# Patient Record
Sex: FEMALE | Race: WHITE | Employment: OTHER | URBAN - METROPOLITAN AREA
[De-identification: names, ages, dates, MRNs, and addresses within clinical notes are randomized per-mention and may not be internally consistent; named-entity substitution may affect disease eponyms.]

---

## 2017-01-06 ENCOUNTER — OFFICE VISIT (OUTPATIENT)
Dept: INTERNAL MEDICINE CLINIC | Age: 72
End: 2017-01-06

## 2017-01-06 VITALS
BODY MASS INDEX: 23.56 KG/M2 | HEIGHT: 64 IN | HEART RATE: 75 BPM | TEMPERATURE: 98.1 F | WEIGHT: 138 LBS | DIASTOLIC BLOOD PRESSURE: 70 MMHG | SYSTOLIC BLOOD PRESSURE: 120 MMHG | RESPIRATION RATE: 16 BRPM | OXYGEN SATURATION: 98 %

## 2017-01-06 DIAGNOSIS — I34.1 MITRAL VALVE PROLAPSE: ICD-10-CM

## 2017-01-06 DIAGNOSIS — R09.81 SINUS CONGESTION: Primary | ICD-10-CM

## 2017-01-06 RX ORDER — PREDNISONE 20 MG/1
TABLET ORAL
Qty: 20 TAB | Refills: 0 | Status: SHIPPED | OUTPATIENT
Start: 2017-01-06 | End: 2017-03-15 | Stop reason: ALTCHOICE

## 2017-01-06 NOTE — PROGRESS NOTES
1. Have you been to the ER, urgent care clinic since your last visit? Hospitalized since your last visit? No    2. Have you seen or consulted any other health care providers outside of the [address] [place] [address] since your last visit? Include any pap smears or colon screening.  No

## 2017-01-06 NOTE — PATIENT INSTRUCTIONS
Health Maintenance Due   Topic    Hepatitis C Screening     FOBT Q 1 YEAR AGE 50-75     ZOSTER VACCINE AGE 60>     GLAUCOMA SCREENING Q2Y     MEDICARE YEARLY EXAM

## 2017-01-07 NOTE — PROGRESS NOTES
The patient presents to the office today with the chief complaint of persistent sinus congestion    HPI    The patient persists with sinus congestion with mild facial fullness and drainage. She has a mild cough. The patient has mitral valve prolapse. She has occasional mild substernal pain. No palpitations. Review of Systems   Respiratory: Negative for shortness of breath. Cardiovascular: Positive for chest pain (Mild chest pain as per HPI). Negative for leg swelling. Allergies   Allergen Reactions    Antihistimine Swelling    Nitrofurantoin Nausea Only       Current Outpatient Prescriptions   Medication Sig Dispense Refill    predniSONE (DELTASONE) 20 mg tablet 3 tabs daily x 3 days, 2 tabs daily x 3 days, 1 tab daily x 3 days, 1/2 tab daily x 3 days 20 Tab 0    LORazepam (ATIVAN) 0.5 mg tablet TAKE 1 TABLET BY MOUTH THREE TIMES DAILY 90 Tab 0    escitalopram oxalate (LEXAPRO) 10 mg tablet TAKE 1 TABLET BY MOUTH DAILY 90 Tab 3    rosuvastatin (CRESTOR) 20 mg tablet TK 1 T PO QD  5    hydroxychloroquine (PLAQUENIL) 200 mg tablet Take 200 mg by mouth two (2) times a day.  aspirin 81 mg tablet Take 81 mg by mouth daily.  multivitamin (ONE A DAY) tablet Take 1 Tab by mouth daily.  omega-3 fatty acids-vitamin e (FISH OIL) 1,000 mg Cap Take 1 Cap by mouth daily.  CALCIUM CARB/VIT D3/MINERALS (CALCIUM 600 + MINERALS PO) Take 600 mg by mouth daily.  NITROSTAT 0.4 mg SL tablet ALLOW ONE TABLET TO DISSOLVE ON TONGUE Q 5 MINUTES PRF PAIN X 3 DOESES IF NEEDED. REPORT TO ER IF PAIN LASTS LONGER THAN 30 MINUTES  6       Past Medical History   Diagnosis Date    Acute upper respiratory infection     Anxiety disorder     Costal chondritis     Dyslipidemia     Dyspnea     Fibromyalgia     Hypotension     Jaw pain     Mitral valve prolapse     Pernicious anemia     Primary fibromyalgia syndrome     Pure hypercholesterolemia     Quadricuspid aortic valve ??       ECHO 7/14  Rheumatoid arthritis(714.0)     Visceral leishmaniasis        Past Surgical History   Procedure Laterality Date    Hx tonsillectomy      Hx partial hysterectomy      Hx heart catheterization  2004     normal       Social History     Social History    Marital status:      Spouse name: N/A    Number of children: N/A    Years of education: N/A     Occupational History    Not on file. Social History Main Topics    Smoking status: Former Smoker     Packs/day: 0.25     Quit date: 4/15/1975    Smokeless tobacco: Never Used    Alcohol use No      Comment: occasional    Drug use: No    Sexual activity: Yes     Partners: Male     Other Topics Concern    Not on file     Social History Narrative       Patient does not have an advanced directive on file    Visit Vitals    /70 (BP 1 Location: Left arm, BP Patient Position: Sitting)    Pulse 75    Temp 98.1 °F (36.7 °C)    Resp 16    Ht 5' 4\" (1.626 m)    Wt 138 lb (62.6 kg)    SpO2 98%    BMI 23.69 kg/m2       Physical Exam   HENT:   Ears:  Normal TM bilaterally   Cardiovascular: Normal rate and regular rhythm. Exam reveals no gallop. No murmur heard. Soft mid systolic click   Pulmonary/Chest: She has no wheezes. She has no rales. Hospital Outpatient Visit on 11/11/2016   Component Date Value Ref Range Status    LIPID PROFILE 11/11/2016        Final    Cholesterol, total 11/11/2016 145  <200 MG/DL Final    Triglyceride 11/11/2016 83  <150 MG/DL Final    HDL Cholesterol 11/11/2016 72* 40 - 60 MG/DL Final    LDL, calculated 11/11/2016 56.4  0 - 100 MG/DL Final    VLDL, calculated 11/11/2016 16.6  MG/DL Final    CHOL/HDL Ratio 11/11/2016 2.0  0 - 5.0   Final       .No results found for any visits on 01/06/17. Assessment / Plan      ICD-10-CM ICD-9-CM    1. Sinus congestion R09.81 478.19    2.  Mitral valve prolapse I34.1 424.0      Prednisone  she was advised to continue her maintenance medications  she is to call if symptoms persist over five days      Follow-up Disposition:  Return in about 5 months (around 6/6/2017). I asked Malik Guzman if she has any questions and I answered the questions. Beena Kong states that she understands the treatment plan and agrees with the treatment plan

## 2017-01-10 ENCOUNTER — TELEPHONE (OUTPATIENT)
Dept: INTERNAL MEDICINE CLINIC | Age: 72
End: 2017-01-10

## 2017-01-10 DIAGNOSIS — F41.9 ANXIETY DISORDER, UNSPECIFIED TYPE: ICD-10-CM

## 2017-01-11 ENCOUNTER — TELEPHONE (OUTPATIENT)
Dept: INTERNAL MEDICINE CLINIC | Age: 72
End: 2017-01-11

## 2017-01-11 RX ORDER — LORAZEPAM 0.5 MG/1
TABLET ORAL
Qty: 90 TAB | Refills: 0 | Status: SHIPPED | OUTPATIENT
Start: 2017-01-11 | End: 2017-02-13 | Stop reason: SDUPTHER

## 2017-01-13 ENCOUNTER — TELEPHONE (OUTPATIENT)
Dept: INTERNAL MEDICINE CLINIC | Age: 72
End: 2017-01-13

## 2017-01-13 NOTE — TELEPHONE ENCOUNTER
Prior Auth request received from Pharmacy for Lorazepam. [place] initiated on [email or web address]. Awaiting response.

## 2017-02-13 DIAGNOSIS — F41.9 ANXIETY DISORDER, UNSPECIFIED TYPE: ICD-10-CM

## 2017-02-13 RX ORDER — LORAZEPAM 0.5 MG/1
TABLET ORAL
Qty: 90 TAB | Refills: 0 | Status: SHIPPED | OUTPATIENT
Start: 2017-02-13 | End: 2017-03-24 | Stop reason: SDUPTHER

## 2017-02-14 ENCOUNTER — TELEPHONE (OUTPATIENT)
Dept: INTERNAL MEDICINE CLINIC | Age: 72
End: 2017-02-14

## 2017-03-03 ENCOUNTER — HOSPITAL ENCOUNTER (OUTPATIENT)
Dept: RESPIRATORY THERAPY | Age: 72
Discharge: HOME | End: 2017-03-03
Attending: INTERNAL MEDICINE
Payer: MEDICARE

## 2017-03-03 DIAGNOSIS — R06.02 SOB (SHORTNESS OF BREATH): ICD-10-CM

## 2017-03-03 PROCEDURE — 94010 BREATHING CAPACITY TEST: CPT

## 2017-03-03 PROCEDURE — 94727 GAS DIL/WSHOT DETER LNG VOL: CPT

## 2017-03-03 PROCEDURE — 94729 DIFFUSING CAPACITY: CPT

## 2017-03-15 ENCOUNTER — OFFICE VISIT (OUTPATIENT)
Dept: INTERNAL MEDICINE CLINIC | Age: 72
End: 2017-03-15

## 2017-03-15 VITALS
HEART RATE: 85 BPM | OXYGEN SATURATION: 98 % | SYSTOLIC BLOOD PRESSURE: 136 MMHG | HEIGHT: 64 IN | TEMPERATURE: 97.1 F | DIASTOLIC BLOOD PRESSURE: 80 MMHG | RESPIRATION RATE: 16 BRPM

## 2017-03-15 DIAGNOSIS — M79.7 PRIMARY FIBROMYALGIA SYNDROME: ICD-10-CM

## 2017-03-15 DIAGNOSIS — F41.9 ANXIETY DISORDER, UNSPECIFIED TYPE: ICD-10-CM

## 2017-03-15 DIAGNOSIS — E78.00 PURE HYPERCHOLESTEROLEMIA: ICD-10-CM

## 2017-03-15 NOTE — PROGRESS NOTES
1. Have you been to the ER, urgent care clinic since your last visit? Hospitalized since your last visit? No    2. Have you seen or consulted any other health care providers outside of the [facility] since your last visit? Include any pap smears or colon screening.  No

## 2017-03-15 NOTE — PATIENT INSTRUCTIONS
Health Maintenance Due   Topic Date Due   Donald Halsted Test  1945    Stool testing for trace blood  04/18/1995    Shingles Vaccine  04/18/2005    Glaucoma Screening   04/18/2010    Annual Well Visit  04/18/2010

## 2017-03-16 ENCOUNTER — HOSPITAL ENCOUNTER (OUTPATIENT)
Dept: CT IMAGING | Age: 72
Discharge: HOME | End: 2017-03-16
Attending: INTERNAL MEDICINE
Payer: MEDICARE

## 2017-03-16 DIAGNOSIS — R06.02 SOB (SHORTNESS OF BREATH): ICD-10-CM

## 2017-03-16 PROCEDURE — 71250 CT THORAX DX C-: CPT

## 2017-03-16 NOTE — PROGRESS NOTES
The patient presents to the office today with the chief complaint of decreased vision secondary to cataracts and for a pre op evaluation    HPI    Beena Guzman complains of decreased vision to bilateral cataracts. she is now scheduled for surgical correction. Other than her the patient has complaints of fatigue, aching and mild dyspnea on exertion. The patient has slight decrease the DLCO. She is scheduled for a chest ct. The patient denies chest pain. Review of Systems   Respiratory: Positive for shortness of breath (Mild dspnea on exertion). Negative for cough. Cardiovascular: Negative for chest pain and leg swelling. Allergies   Allergen Reactions    Antihistimine Swelling    Nitrofurantoin Nausea Only       Current Outpatient Prescriptions   Medication Sig Dispense Refill    PROAIR HFA 90 mcg/actuation inhaler INHALE 2 PUFFS BY MOUTH EVERY 4 HOURS AS NEEDED FOR WHEEZING 1 Inhaler 2    LORazepam (ATIVAN) 0.5 mg tablet TAKE 1 TABLET BY MOUTH THREE TIMES DAILY 90 Tab 0    escitalopram oxalate (LEXAPRO) 10 mg tablet TAKE 1 TABLET BY MOUTH DAILY 90 Tab 3    NITROSTAT 0.4 mg SL tablet ALLOW ONE TABLET TO DISSOLVE ON TONGUE Q 5 MINUTES PRF PAIN X 3 DOESES IF NEEDED. REPORT TO ER IF PAIN LASTS LONGER THAN 30 MINUTES  6    rosuvastatin (CRESTOR) 20 mg tablet TK 1 T PO QD  5    hydroxychloroquine (PLAQUENIL) 200 mg tablet Take 200 mg by mouth two (2) times a day.  aspirin 81 mg tablet Take 81 mg by mouth daily.  multivitamin (ONE A DAY) tablet Take 1 Tab by mouth daily.  omega-3 fatty acids-vitamin e (FISH OIL) 1,000 mg Cap Take 1 Cap by mouth daily.  CALCIUM CARB/VIT D3/MINERALS (CALCIUM 600 + MINERALS PO) Take 600 mg by mouth daily.          Past Medical History:   Diagnosis Date    Acute upper respiratory infection     Anxiety disorder     Costal chondritis     Dyslipidemia     Dyspnea     Fibromyalgia     Hypotension     Jaw pain     Mitral valve prolapse  Pernicious anemia     Primary fibromyalgia syndrome     Pure hypercholesterolemia     Quadricuspid aortic valve ?? ECHO 7/14    Rheumatoid arthritis(714.0)     Visceral leishmaniasis        Past Surgical History:   Procedure Laterality Date    HX HEART CATHETERIZATION  2004    normal    HX PARTIAL HYSTERECTOMY      HX TONSILLECTOMY         Social History     Social History    Marital status:      Spouse name: N/A    Number of children: N/A    Years of education: N/A     Occupational History    Not on file. Social History Main Topics    Smoking status: Former Smoker     Packs/day: 0.25     Quit date: 4/15/1975    Smokeless tobacco: Never Used    Alcohol use No      Comment: occasional    Drug use: No    Sexual activity: Yes     Partners: Male     Other Topics Concern    Not on file     Social History Narrative       Patient does not have an advanced directive on file    Visit Vitals    /80 (BP 1 Location: Right arm, BP Patient Position: Sitting)    Pulse 85    Temp 97.1 °F (36.2 °C) (Tympanic)    Resp 16    Ht 5' 4\" (1.626 m)    SpO2 98%       Physical Exam   No Cervical Lymphadenopathy  No Supraclavicular Lymphadenopathy  Thyroid is Normal  Lungs are clear to ausculation and percussion  Heart:  S1 S2 are normal, No gallops, No mummers  No Carotid Bruits  Abdomen:  Normal Bowel Sounds. No masses. No Hepatomegaly or Splenomegly  LE:  Strong Pedal Pulses. No Edema    BMI:  OK    No visits with results within 3 Month(s) from this visit.   Latest known visit with results is:    Hospital Outpatient Visit on 11/11/2016   Component Date Value Ref Range Status    LIPID PROFILE 11/11/2016        Final    Cholesterol, total 11/11/2016 145  <200 MG/DL Final    Triglyceride 11/11/2016 83  <150 MG/DL Final    HDL Cholesterol 11/11/2016 72* 40 - 60 MG/DL Final    LDL, calculated 11/11/2016 56.4  0 - 100 MG/DL Final    VLDL, calculated 11/11/2016 16.6  MG/DL Final    CHOL/HDL Ratio 11/11/2016 2.0  0 - 5.0   Final       .No results found for any visits on 03/15/17. Assessment / Plan      ICD-10-CM ICD-9-CM    1. Cataract, nuclear, bilateral H25.13 366.16    2. Primary fibromyalgia syndrome M79.7 729.1    3. Pure hypercholesterolemia E78.00 272.0    4. Anxiety disorder, unspecified type F41.9 300.00        THE PATIENT IS OK FOR SURGERY    Follow-up Disposition:  Return in about 6 months (around 9/15/2017). I asked Torres Guzman if she has any questions and I answered the questions. Beena Mirza states that she understands the treatment plan and agrees with the treatment plan

## 2017-03-17 ENCOUNTER — TELEPHONE (OUTPATIENT)
Dept: INTERNAL MEDICINE CLINIC | Age: 72
End: 2017-03-17

## 2017-03-24 DIAGNOSIS — F41.9 ANXIETY DISORDER, UNSPECIFIED TYPE: ICD-10-CM

## 2017-03-24 RX ORDER — LORAZEPAM 0.5 MG/1
TABLET ORAL
Qty: 90 TAB | Refills: 0 | Status: SHIPPED | OUTPATIENT
Start: 2017-03-24 | End: 2017-04-25 | Stop reason: SDUPTHER

## 2017-04-24 ENCOUNTER — HOSPITAL ENCOUNTER (OUTPATIENT)
Dept: LAB | Age: 72
Discharge: HOME | End: 2017-04-24
Payer: MEDICARE

## 2017-04-24 LAB
BASOPHILS # BLD AUTO: 0 K/UL (ref 0–0.1)
BASOPHILS # BLD: 0 % (ref 0–2)
CRP SERPL-MCNC: <0.3 MG/DL (ref 0–0.3)
DIFFERENTIAL METHOD BLD: NORMAL
EOSINOPHIL # BLD: 0.1 K/UL (ref 0–0.4)
EOSINOPHIL NFR BLD: 1 % (ref 0–5)
ERYTHROCYTE [DISTWIDTH] IN BLOOD BY AUTOMATED COUNT: 13.5 % (ref 11.6–14.5)
ERYTHROCYTE [SEDIMENTATION RATE] IN BLOOD: 3 MM/HR (ref 0–30)
HCT VFR BLD AUTO: 41.8 % (ref 35–45)
HGB BLD-MCNC: 14.4 G/DL (ref 12–16)
LYMPHOCYTES # BLD AUTO: 29 % (ref 21–52)
LYMPHOCYTES # BLD: 1.9 K/UL (ref 0.9–3.6)
MCH RBC QN AUTO: 32.1 PG (ref 24–34)
MCHC RBC AUTO-ENTMCNC: 34.4 G/DL (ref 31–37)
MCV RBC AUTO: 93.1 FL (ref 74–97)
MONOCYTES # BLD: 0.6 K/UL (ref 0.05–1.2)
MONOCYTES NFR BLD AUTO: 9 % (ref 3–10)
NEUTS SEG # BLD: 4 K/UL (ref 1.8–8)
NEUTS SEG NFR BLD AUTO: 61 % (ref 40–73)
PLATELET # BLD AUTO: 245 K/UL (ref 135–420)
PMV BLD AUTO: 10 FL (ref 9.2–11.8)
RBC # BLD AUTO: 4.49 M/UL (ref 4.2–5.3)
WBC # BLD AUTO: 6.6 K/UL (ref 4.6–13.2)

## 2017-04-24 PROCEDURE — 85025 COMPLETE CBC W/AUTO DIFF WBC: CPT | Performed by: INTERNAL MEDICINE

## 2017-04-24 PROCEDURE — 82785 ASSAY OF IGE: CPT | Performed by: INTERNAL MEDICINE

## 2017-04-24 PROCEDURE — 36415 COLL VENOUS BLD VENIPUNCTURE: CPT | Performed by: INTERNAL MEDICINE

## 2017-04-24 PROCEDURE — 86003 ALLG SPEC IGE CRUDE XTRC EA: CPT | Performed by: INTERNAL MEDICINE

## 2017-04-24 PROCEDURE — 86038 ANTINUCLEAR ANTIBODIES: CPT

## 2017-04-24 PROCEDURE — 86331 IMMUNODIFFUSION OUCHTERLONY: CPT

## 2017-04-24 PROCEDURE — 86038 ANTINUCLEAR ANTIBODIES: CPT | Performed by: INTERNAL MEDICINE

## 2017-04-24 PROCEDURE — 86431 RHEUMATOID FACTOR QUANT: CPT

## 2017-04-24 PROCEDURE — 85652 RBC SED RATE AUTOMATED: CPT | Performed by: INTERNAL MEDICINE

## 2017-04-24 PROCEDURE — 86140 C-REACTIVE PROTEIN: CPT | Performed by: INTERNAL MEDICINE

## 2017-04-24 PROCEDURE — 86480 TB TEST CELL IMMUN MEASURE: CPT | Performed by: INTERNAL MEDICINE

## 2017-04-24 PROCEDURE — 82164 ANGIOTENSIN I ENZYME TEST: CPT | Performed by: INTERNAL MEDICINE

## 2017-04-25 DIAGNOSIS — F41.9 ANXIETY DISORDER, UNSPECIFIED TYPE: ICD-10-CM

## 2017-04-25 RX ORDER — LORAZEPAM 0.5 MG/1
TABLET ORAL
Qty: 90 TAB | Refills: 0 | Status: SHIPPED | OUTPATIENT
Start: 2017-04-25 | End: 2017-05-18 | Stop reason: SDUPTHER

## 2017-04-25 RX ORDER — LORAZEPAM 0.5 MG/1
TABLET ORAL
Qty: 90 TAB | Refills: 0 | Status: SHIPPED | OUTPATIENT
Start: 2017-04-25 | End: 2017-04-25 | Stop reason: SDUPTHER

## 2017-04-26 LAB
ACE SERPL-CCNC: 40 U/L (ref 14–82)
ANA SER QL: NEGATIVE
IGE SERPL-ACNC: 17 IU/ML (ref 0–100)
SEE BELOW:, 164879: NORMAL

## 2017-04-27 LAB
A ALTERNATA IGE QN: <0.1 KU/L
A FUMIGATUS IGE QN: <0.1 KU/L
AMER ROACH IGE QN: <0.1 KU/L
AMER SYCAMORE IGE QN: <0.1 KU/L
ANNOTATION COMMENT IMP: NORMAL
BAHIA GRASS IGE QN: 0.26 KU/L
BERMUDA GRASS IGE QN: <0.1 KU/L
BOXELDER IGE QN: 1.09 KU/L
C HERBARUM IGE QN: <0.1 KU/L
CAT DANDER IGG QN: <0.1 KU/L
CLASS DESCRIPTION, 600268: ABNORMAL
COMMON RAGWEED IGE QN: 0.11 KU/L
D FARINAE IGE QN: <0.1 KU/L
D PTERONYSS IGE QN: <0.1 KU/L
DEPRECATED IGE QN: <0.1 KU/L
DOG DANDER IGE QN: <0.1 KU/L
ENGL PLANTAIN IGE QN: <0.1 KU/L
JOHNSON GRASS IGE QN: 0.28 KU/L
M RACEMOSUS IGE QN: <0.1 KU/L
M TB IFN-G CD4+ BCKGRND COR BLD-ACNC: <0 IU/ML
M TB IFN-G CD4+ T-CELLS BLD-ACNC: 0.02 IU/ML
M TB TUBERC IFN-G BLD QL: NEGATIVE
M TB TUBERC IGNF/MITOGEN IGNF CONTROL: 7.89 IU/ML
MT JUNIPER IGE QN: <0.1 KU/L
MUGWORT IGE QN: <0.1 KU/L
NETTLE IGE QN: <0.1 KU/L
P NOTATUM IGE QN: <0.1 KU/L
QUANTIFERON NIL VALUE: 0.04 IU/ML
S BOTRYOSUM IGE QN: <0.1 KU/L
SERVICE CMNT-IMP: NORMAL
SHEEP SORREL IGE QN: <0.1 KU/L
SWEET GUM IGE QN: <0.1 KU/L
TIMOTHY IGE QN: 0.71 KU/L
WHITE BIRCH IGE QN: <0.1 KU/L
WHITE ELM IGG QN: <0.1 KU/L
WHITE HICKORY IGE QN: <0.1 KU/L
WHITE MULBERRY IGE QN: <0.1 KU/L
WHITE OAK IGE QN: <0.1 KU/L

## 2017-05-03 LAB
Lab: NORMAL
Lab: NORMAL
REFERENCE LAB,REFLB: NORMAL
REFERENCE LAB,REFLB: NORMAL
TEST DESCRIPTION:,ATST: NORMAL
TEST DESCRIPTION:,ATST: NORMAL

## 2017-05-18 DIAGNOSIS — F41.9 ANXIETY DISORDER, UNSPECIFIED TYPE: ICD-10-CM

## 2017-05-18 RX ORDER — LORAZEPAM 0.5 MG/1
TABLET ORAL
Qty: 90 TAB | Refills: 0 | Status: SHIPPED | OUTPATIENT
Start: 2017-05-18 | End: 2017-06-27 | Stop reason: SDUPTHER

## 2017-05-22 ENCOUNTER — TELEPHONE (OUTPATIENT)
Dept: INTERNAL MEDICINE CLINIC | Age: 72
End: 2017-05-22

## 2017-06-27 DIAGNOSIS — F41.9 ANXIETY DISORDER, UNSPECIFIED TYPE: ICD-10-CM

## 2017-06-27 RX ORDER — LORAZEPAM 0.5 MG/1
TABLET ORAL
Qty: 90 TAB | Refills: 0 | Status: SHIPPED | OUTPATIENT
Start: 2017-06-27 | End: 2017-08-08 | Stop reason: SDUPTHER

## 2017-08-08 DIAGNOSIS — F41.9 ANXIETY DISORDER, UNSPECIFIED TYPE: ICD-10-CM

## 2017-08-08 RX ORDER — LORAZEPAM 0.5 MG/1
TABLET ORAL
Qty: 90 TAB | Refills: 0 | Status: SHIPPED | OUTPATIENT
Start: 2017-08-08 | End: 2017-09-18 | Stop reason: SDUPTHER

## 2017-08-25 ENCOUNTER — ANESTHESIA EVENT (OUTPATIENT)
Dept: ENDOSCOPY | Age: 72
End: 2017-08-25
Payer: MEDICARE

## 2017-08-28 ENCOUNTER — HOSPITAL ENCOUNTER (OUTPATIENT)
Age: 72
Setting detail: OUTPATIENT SURGERY
Discharge: HOME | End: 2017-08-28
Attending: INTERNAL MEDICINE | Admitting: INTERNAL MEDICINE
Payer: MEDICARE

## 2017-08-28 ENCOUNTER — ANESTHESIA (OUTPATIENT)
Dept: ENDOSCOPY | Age: 72
End: 2017-08-28
Payer: MEDICARE

## 2017-08-28 VITALS
OXYGEN SATURATION: 95 % | WEIGHT: 130.13 LBS | SYSTOLIC BLOOD PRESSURE: 132 MMHG | TEMPERATURE: 97.8 F | RESPIRATION RATE: 14 BRPM | BODY MASS INDEX: 22.22 KG/M2 | DIASTOLIC BLOOD PRESSURE: 71 MMHG | HEIGHT: 64 IN | HEART RATE: 82 BPM

## 2017-08-28 LAB
ATRIAL RATE: 74 BPM
CALCULATED P AXIS, ECG09: 4 DEGREES
CALCULATED R AXIS, ECG10: -11 DEGREES
CALCULATED T AXIS, ECG11: 35 DEGREES
DIAGNOSIS, 93000: NORMAL
P-R INTERVAL, ECG05: 144 MS
Q-T INTERVAL, ECG07: 386 MS
QRS DURATION, ECG06: 88 MS
QTC CALCULATION (BEZET), ECG08: 428 MS
VENTRICULAR RATE, ECG03: 74 BPM

## 2017-08-28 PROCEDURE — 77030019988 HC FCPS ENDOSC DISP BSC -B: Performed by: INTERNAL MEDICINE

## 2017-08-28 PROCEDURE — 77030008565 HC TBNG SUC IRR ERBE -B: Performed by: INTERNAL MEDICINE

## 2017-08-28 PROCEDURE — 74011250636 HC RX REV CODE- 250/636: Performed by: NURSE ANESTHETIST, CERTIFIED REGISTERED

## 2017-08-28 PROCEDURE — 88305 TISSUE EXAM BY PATHOLOGIST: CPT | Performed by: INTERNAL MEDICINE

## 2017-08-28 PROCEDURE — 74011250636 HC RX REV CODE- 250/636

## 2017-08-28 PROCEDURE — 77030018846 HC SOL IRR STRL H20 ICUM -A: Performed by: INTERNAL MEDICINE

## 2017-08-28 PROCEDURE — 74011000250 HC RX REV CODE- 250: Performed by: NURSE ANESTHETIST, CERTIFIED REGISTERED

## 2017-08-28 PROCEDURE — 74011000250 HC RX REV CODE- 250

## 2017-08-28 PROCEDURE — 88342 IMHCHEM/IMCYTCHM 1ST ANTB: CPT | Performed by: INTERNAL MEDICINE

## 2017-08-28 PROCEDURE — 93005 ELECTROCARDIOGRAM TRACING: CPT

## 2017-08-28 PROCEDURE — 76060000031 HC ANESTHESIA FIRST 0.5 HR: Performed by: INTERNAL MEDICINE

## 2017-08-28 PROCEDURE — 76040000019: Performed by: INTERNAL MEDICINE

## 2017-08-28 RX ORDER — PROPOFOL 10 MG/ML
INJECTION, EMULSION INTRAVENOUS AS NEEDED
Status: DISCONTINUED | OUTPATIENT
Start: 2017-08-28 | End: 2017-08-28 | Stop reason: HOSPADM

## 2017-08-28 RX ORDER — GLYCOPYRROLATE 0.2 MG/ML
INJECTION INTRAMUSCULAR; INTRAVENOUS AS NEEDED
Status: DISCONTINUED | OUTPATIENT
Start: 2017-08-28 | End: 2017-08-28 | Stop reason: HOSPADM

## 2017-08-28 RX ORDER — SODIUM CHLORIDE, SODIUM LACTATE, POTASSIUM CHLORIDE, CALCIUM CHLORIDE 600; 310; 30; 20 MG/100ML; MG/100ML; MG/100ML; MG/100ML
75 INJECTION, SOLUTION INTRAVENOUS CONTINUOUS
Status: DISCONTINUED | OUTPATIENT
Start: 2017-08-29 | End: 2017-08-28 | Stop reason: HOSPADM

## 2017-08-28 RX ORDER — ONDANSETRON 2 MG/ML
4 INJECTION INTRAMUSCULAR; INTRAVENOUS ONCE
Status: CANCELLED | OUTPATIENT
Start: 2017-08-28 | End: 2017-08-28

## 2017-08-28 RX ORDER — SODIUM CHLORIDE 0.9 % (FLUSH) 0.9 %
5-10 SYRINGE (ML) INJECTION AS NEEDED
Status: CANCELLED | OUTPATIENT
Start: 2017-08-28

## 2017-08-28 RX ADMIN — PROPOFOL 20 MG: 10 INJECTION, EMULSION INTRAVENOUS at 10:51

## 2017-08-28 RX ADMIN — GLYCOPYRROLATE 0.2 MG: 0.2 INJECTION INTRAMUSCULAR; INTRAVENOUS at 10:47

## 2017-08-28 RX ADMIN — FAMOTIDINE 20 MG: 10 INJECTION, SOLUTION INTRAVENOUS at 10:38

## 2017-08-28 RX ADMIN — PROPOFOL 100 MG: 10 INJECTION, EMULSION INTRAVENOUS at 10:48

## 2017-08-28 RX ADMIN — SODIUM CHLORIDE, SODIUM LACTATE, POTASSIUM CHLORIDE, AND CALCIUM CHLORIDE 75 ML/HR: 600; 310; 30; 20 INJECTION, SOLUTION INTRAVENOUS at 10:39

## 2017-08-28 NOTE — H&P
H&P is updated and reviewed, physical exam was performed and medications/allergies were reviewed immediately prior to anesthesia.     Lucy Sheldon MD

## 2017-08-28 NOTE — DISCHARGE INSTRUCTIONS
Upper GI Endoscopy: What to Expect at [address] [place] [address]  After you have an endoscopy, you will stay at the hospital or clinic for 1 to 2 hours. This will allow the medicine to wear off. You will be able to go home after your doctor or nurse checks to make sure you are not having any problems. You may have to stay overnight if you had treatment during the test. You may have a sore throat for a day or two after the test.  This care sheet gives you a general idea about what to expect after the test.  How can you care for yourself at home? Activity  · Rest as much as you need to after you go home. · You should be able to go back to your usual activities the day after the test.  Diet  · Follow your doctor's directions for eating after the test.  · Drink plenty of fluids (unless your doctor has told you not to). Medications  · If you have a sore throat the day after the test, use an over-the-counter spray to numb your throat. Follow-up care is a key part of your treatment and safety. Be sure to make and go to all appointments, and call your doctor if you are having problems. It's also a good idea to know your test results and keep a list of the medicines you take. When should you call for help? Call 911 anytime you think you may need emergency care. For example, call if:  · You passed out (lost consciousness). · You cough up blood. · You vomit blood or what looks like coffee grounds. · You pass maroon or very bloody stools. Call your doctor now or seek immediate medical care if:  · You have trouble swallowing. · You have belly pain. · Your stools are black and tarlike or have streaks of blood. · You are sick to your stomach or cannot keep fluids down. Watch closely for changes in your health, and be sure to contact your doctor if:  · Your throat still hurts after a day or two. · You do not get better as expected. Where can you learn more? Go to http://benja-alex.info/.   Enter A899 in the search box to learn more about \"Upper GI Endoscopy: What to Expect at Home. \"  Current as of: August 9, 2016  Content Version: 11.3  © 0846-5144 [employer]. Care instructions adapted under license by [employer] (which disclaims liability or warranty for this information). If you have questions about a medical condition or this instruction, always ask your healthcare professional. [facility] [address] any warranty or liability for your use of this information. Peptic Ulcer Disease: Care Instructions  Your Care Instructions    Peptic ulcers are sores on the inside of the stomach or the small intestine. They are usually caused by an infection with bacteria or from use of nonsteroidal anti-inflammatory drugs (NSAIDs). NSAIDs include aspirin, ibuprofen (Advil), and naproxen (Aleve). Your doctor may have prescribed medicine to reduce stomach acid. You also may need to take antibiotics if your peptic ulcers are caused by an infection. You can help your stomach heal and keep ulcers from coming back by making some changes in your lifestyle. Quit smoking, limit caffeine and alcohol, and reduce stress. Follow-up care is a key part of your treatment and safety. Be sure to make and go to all appointments, and call your doctor if you are having problems. Its also a good idea to know your test results and keep a list of the medicines you take. How can you care for yourself at home? · Take your medicines exactly as prescribed. Call your doctor if you think you are having a problem with your medicine. · Do not take aspirin or other NSAIDs such as ibuprofen (Advil or Motrin) or naproxen (Aleve). Ask your doctor what you can take for pain. · Do not smoke. Smoking can make ulcers worse. If you need help quitting, talk to your doctor about stop-smoking programs and medicines. These can increase your chances of quitting for good. · Drink in moderation or avoid drinking alcohol.   · Do not drink beverages that have caffeine if they bother your stomach. These include coffee, tea, and soda. · Eat a balanced diet of small, frequent meals. Make an appointment with a dietitian if you need help planning your meals. · Reduce stress. Avoid people and places that make you feel anxious, if you can. Learn ways to reduce stress, such as biofeedback, guided imagery, and meditation. When should you call for help? Call 911 anytime you think you may need emergency care. For example, call if:  · You passed out (lost consciousness). · You vomit blood or what looks like coffee grounds. · You pass maroon or very bloody stools. Call your doctor now or seek immediate medical care if:  · You have severe pain in your belly, back, or shoulders. · You have new or worsening belly pain. · You are dizzy or lightheaded, or you feel like you may faint. · Your stools are black and tarlike or have streaks of blood. Watch closely for changes in your health, and be sure to contact your doctor if:  · You have new symptoms such as weight loss, nausea or vomiting. · You do not feel better as expected. Where can you learn more? Go to http://benja-alex.info/. Enter V452 in the search box to learn more about \"Peptic Ulcer Disease: Care Instructions. \"  Current as of: August 9, 2016  Content Version: 11.3  © 8562-0116 [employer]. Care instructions adapted under license by [employer] (which disclaims liability or warranty for this information). If you have questions about a medical condition or this instruction, always ask your healthcare professional. [place] [address] any warranty or liability for your use of this information.     DISCHARGE SUMMARY from Nurse    The following personal items are in your possession at time of discharge:    Dental Appliances: None  Visual Aid: None                  PATIENT INSTRUCTIONS:    After general anesthesia or intravenous sedation, for 24 hours or while taking prescription Narcotics:  · Limit your activities  · Do not drive and operate hazardous machinery  · Do not make important personal or business decisions  · Do  not drink alcoholic beverages  · If you have not urinated within 8 hours after discharge, please contact your surgeon on call. Report the following to your surgeon:  · Excessive pain, swelling, redness or odor of or around the surgical area  · Temperature over 100.5  · Nausea and vomiting lasting longer than 4 hours or if unable to take medications  · Any signs of decreased circulation or nerve impairment to extremity: change in color, persistent  numbness, tingling, coldness or increase pain  · Any questions      *  Please give a list of your current medications to your Primary Care Provider. *  Please update this list whenever your medications are discontinued, doses are      changed, or new medications (including over-the-counter products) are added. *  Please carry medication information at all times in case of emergency situations. These are general instructions for a healthy lifestyle:    No smoking/ No tobacco products/ Avoid exposure to second hand smoke    Surgeon General's Warning:  Quitting smoking now greatly reduces serious risk to your health. Obesity, smoking, and sedentary lifestyle greatly increases your risk for illness    A healthy diet, regular physical exercise & weight monitoring are important for maintaining a healthy lifestyle    You may be retaining fluid if you have a history of heart failure or if you experience any of the following symptoms:  Weight gain of 3 pounds or more overnight or 5 pounds in a week, increased swelling in our hands or feet or shortness of breath while lying flat in bed. Please call your doctor as soon as you notice any of these symptoms; do not wait until your next office visit.     Recognize signs and symptoms of STROKE:    F-face looks uneven    A-arms unable to move or move unevenly    S-speech slurred or non-existent    T-time-call 911 as soon as signs and symptoms begin-DO NOT go       Back to bed or wait to see if you get better-TIME IS BRAIN. Warning Signs of HEART ATTACK     Call 911 if you have these symptoms:   Chest discomfort. Most heart attacks involve discomfort in the center of the chest that lasts more than a few minutes, or that goes away and comes back. It can feel like uncomfortable pressure, squeezing, fullness, or pain.  Discomfort in other areas of the upper body. Symptoms can include pain or discomfort in one or both arms, the back, neck, jaw, or stomach.  Shortness of breath with or without chest discomfort.  Other signs may include breaking out in a cold sweat, nausea, or lightheadedness. Don't wait more than five minutes to call 911 - MINUTES MATTER! Fast action can save your life. Calling 911 is almost always the fastest way to get lifesaving treatment. Emergency Medical Services staff can begin treatment when they arrive -- up to an hour sooner than if someone gets to the hospital by car. The discharge information has been reviewed with the patient and spouse. The patient and spouse verbalized understanding. Discharge medications reviewed with the patient and spouse and appropriate educational materials and side effects teaching were provided.

## 2017-08-28 NOTE — ANESTHESIA PREPROCEDURE EVALUATION
Anesthetic History               Review of Systems / Medical History  Patient summary reviewed, nursing notes reviewed and pertinent labs reviewed    Pulmonary                   Neuro/Psych         Psychiatric history     Cardiovascular                       GI/Hepatic/Renal               Comments: dysphagia Endo/Other        Arthritis     Other Findings   Comments:   Risk Factors for Postoperative nausea/vomiting:       History of postoperative nausea/vomiting? NO       Female? YES       Motion sickness? NO       Intended opioid administration for postoperative analgesia? NO      Smoking Abstinence  Current Smoker? NO  Elective Surgery? YES  Seen preoperatively by anesthesiologist or proxy prior to day of surgery? YES  Pt abstained from smoking 24 hours prior to anesthesia?  N/A           Physical Exam    Airway  Mallampati: II  TM Distance: > 6 cm  Neck ROM: normal range of motion   Mouth opening: Normal     Cardiovascular    Rhythm: regular  Rate: normal         Dental  No notable dental hx       Pulmonary  Breath sounds clear to auscultation               Abdominal  GI exam deferred       Other Findings            Anesthetic Plan    ASA: 2  Anesthesia type: MAC          Induction: Intravenous  Anesthetic plan and risks discussed with: Patient

## 2017-08-28 NOTE — IP AVS SNAPSHOT
Zander Paul 
 
 
 0 [facility] [address] 
[phone] Patient: Imelda Lindsay MRN: [id number] You are allergic to the following Allergen Reactions Antihistimine Swelling Nitrofurantoin Nausea Only Recent Documentation Height Weight BMI OB Status Smoking Status 1.626 m 59 kg 22.34 kg/m2 Hysterectomy Former Smoker Emergency Contacts Name Discharge Info Relation Home Work Mobile ThomasJoaquin DISCHARGE CAREGIVER [3] Spouse [3] [phone] About your hospitalization You were admitted on:  2017 You last received care in the:  [facility] PACU You were discharged on:  2017 Unit phone number:  [phone] Why you were hospitalized Your primary diagnosis was:  Not on File Providers Seen During Your Hospitalizations Provider Role Specialty Primary office phone Uma Way MD Attending Provider Gastroenterology [phone] Your Primary Care Physician (PCP) Primary Care Physician Office Phone Office Fax [phone] , [address] [place] [address] [phone] Follow-up Information Follow up With Details Comments Contact Info Edgar Coello MD   [address] [facility] [address] [phone] Uma [address] MD  Follow up as instructed [address] [place] [address] Drive [address] [facility] [address] 
[phone] Your Appointments 2017 ENDOSCOPY with Uma Way MD  
[facility] ENDOSCOPY [address] [facility] [address]) 920 [facility] [address] 2017  3:00 PM EDT  
COMPLETE PHYSICAL with Edgar Coello MD  
[address] [place] [address] Road) 340 [facility], [address] [facility] [address] [phone] Current Discharge Medication List  
  
CONTINUE these medications which have NOT CHANGED Dose & Instructions Dispensing Information Comments Morning Noon Evening Bedtime  
 aspirin 81 mg tablet Your last dose was: Your next dose is:    
   
   
 Dose:  81 mg Take 81 mg by mouth daily. Refills:  0  
     
   
   
   
  
 CALCIUM 600 + MINERALS PO Your last dose was: Your next dose is:    
   
   
 Dose:  600 mg Take 600 mg by mouth daily. Refills:  0  
     
   
   
   
  
 escitalopram oxalate 10 mg tablet Commonly known as:  Henrry Nascimento Your last dose was: Your next dose is: TAKE 1 TABLET BY MOUTH DAILY Quantity:  90 Tab Refills:  3  
 **Patient requests 90 days supply** FISH OIL 1,000 mg Cap Generic drug:  omega-3 fatty acids-vitamin e Your last dose was: Your next dose is:    
   
   
 Dose:  1 Cap Take 1 Cap by mouth daily. Refills:  0 LORazepam 0.5 mg tablet Commonly known as:  ATIVAN Your last dose was: Your next dose is: TAKE 1 TABLET BY MOUTH THREE TIMES DAILY Quantity:  90 Tab Refills:  0  
     
   
   
   
  
 multivitamin tablet Commonly known as:  ONE A DAY Your last dose was: Your next dose is:    
   
   
 Dose:  1 Tab Take 1 Tab by mouth daily. Refills:  0  
     
   
   
   
  
 NITROSTAT 0.4 mg SL tablet Generic drug:  nitroglycerin Your last dose was: Your next dose is:    
   
   
 ALLOW ONE TABLET TO DISSOLVE ON TONGUE Q 5 MINUTES PRF PAIN X 3 DOESES IF NEEDED. REPORT TO ER IF PAIN LASTS LONGER THAN 30 MINUTES Refills:  6 PLAQUENIL 200 mg tablet Generic drug:  hydroxychloroquine Your last dose was: Your next dose is:    
   
   
 Dose:  200 mg Take 200 mg by mouth two (2) times a day. Refills:  0 PROAIR HFA 90 mcg/actuation inhaler Generic drug:  albuterol Your last dose was: Your next dose is:    
   
   
 INHALE 2 PUFFS BY MOUTH EVERY 4 HOURS AS NEEDED FOR WHEEZING Quantity:  1 Inhaler Refills:  2  
     
   
   
   
  
 rosuvastatin 20 mg tablet Commonly known as:  CRESTOR Your last dose was: Your next dose is:    
   
   
 TK 1 T PO QD Refills:  5 Discharge Instructions Upper GI Endoscopy: What to Expect at [facility] Your Recovery After you have an endoscopy, you will stay at the hospital or clinic for 1 to 2 hours. This will allow the medicine to wear off. You will be able to go home after your doctor or nurse checks to make sure you are not having any problems. You may have to stay overnight if you had treatment during the test. You may have a sore throat for a day or two after the test. 
This care sheet gives you a general idea about what to expect after the test. 
How can you care for yourself at home? Activity · Rest as much as you need to after you go home. · You should be able to go back to your usual activities the day after the test. 
Diet · Follow your doctor's directions for eating after the test. 
· Drink plenty of fluids (unless your doctor has told you not to). Medications · If you have a sore throat the day after the test, use an over-the-counter spray to numb your throat. Follow-up care is a key part of your treatment and safety. Be sure to make and go to all appointments, and call your doctor if you are having problems. It's also a good idea to know your test results and keep a list of the medicines you take. When should you call for help? Call 911 anytime you think you may need emergency care. For example, call if: 
· You passed out (lost consciousness). · You cough up blood. · You vomit blood or what looks like coffee grounds. · You pass maroon or very bloody stools. Call your doctor now or seek immediate medical care if: 
· You have trouble swallowing. · You have belly pain. · Your stools are black and tarlike or have streaks of blood. · You are sick to your stomach or cannot keep fluids down. Watch closely for changes in your health, and be sure to contact your doctor if: 
· Your throat still hurts after a day or two. · You do not get better as expected. Where can you learn more? Go to http://benja-alex.info/. Enter ([phone] [phone] in the search box to learn more about \"Upper GI Endoscopy: What to Expect at Home. \" Current as of: August 9, 2016 Content Version: 11.3 © 7058-1774 [employer] Care instructions adapted under license by [employer] (which disclaims liability or warranty for this information). If you have questions about a medical condition or this instruction, always ask your healthcare professional. [place] [address] any warranty or liability for your use of this information. Peptic Ulcer Disease: Care Instructions Your Care Instructions Peptic ulcers are sores on the inside of the stomach or the small intestine. They are usually caused by an infection with bacteria or from use of nonsteroidal anti-inflammatory drugs (NSAIDs). NSAIDs include aspirin, ibuprofen (Advil), and naproxen (Aleve). Your doctor may have prescribed medicine to reduce stomach acid. You also may need to take antibiotics if your peptic ulcers are caused by an infection. You can help your stomach heal and keep ulcers from coming back by making some changes in your lifestyle. Quit smoking, limit caffeine and alcohol, and reduce stress. Follow-up care is a key part of your treatment and safety. Be sure to make and go to all appointments, and call your doctor if you are having problems. Its also a good idea to know your test results and keep a list of the medicines you take. How can you care for yourself at home? · Take your medicines exactly as prescribed. Call your doctor if you think you are having a problem with your medicine. · Do not take aspirin or other NSAIDs such as ibuprofen (Advil or Motrin) or naproxen (Aleve). Ask your doctor what you can take for pain. · Do not smoke. Smoking can make ulcers worse. If you need help quitting, talk to your doctor about stop-smoking programs and medicines. These can increase your chances of quitting for good. · Drink in moderation or avoid drinking alcohol. · Do not drink beverages that have caffeine if they bother your stomach. These include coffee, tea, and soda. · Eat a balanced diet of small, frequent meals. Make an appointment with a dietitian if you need help planning your meals. · Reduce stress. Avoid people and places that make you feel anxious, if you can. Learn ways to reduce stress, such as biofeedback, guided imagery, and meditation. When should you call for help? Call 911 anytime you think you may need emergency care. For example, call if: 
· You passed out (lost consciousness). · You vomit blood or what looks like coffee grounds. · You pass maroon or very bloody stools. Call your doctor now or seek immediate medical care if: 
· You have severe pain in your belly, back, or shoulders. · You have new or worsening belly pain. · You are dizzy or lightheaded, or you feel like you may faint. · Your stools are black and tarlike or have streaks of blood. Watch closely for changes in your health, and be sure to contact your doctor if: 
· You have new symptoms such as weight loss, nausea or vomiting. · You do not feel better as expected. Where can you learn more? Go to http://benja-alex.info/. Enter J770 in the search box to learn more about \"Peptic Ulcer Disease: Care Instructions. \" Current as of: August 9, 2016 Content Version: 11.3 © 9033-8184 [employer].  Care instructions adapted under license by [address] (which disclaims liability or warranty for this information). If you have questions about a medical condition or this instruction, always ask your healthcare professional. [address] any warranty or liability for your use of this information. DISCHARGE SUMMARY from Nurse The following personal items are in your possession at time of discharge: 
 
Dental Appliances: None Visual Aid: None PATIENT INSTRUCTIONS: 
 
 
F-face looks uneven A-arms unable to move or move unevenly S-speech slurred or non-existent T-time-call 911 as soon as signs and symptoms begin-DO NOT go Back to bed or wait to see if you get better-TIME IS BRAIN. Warning Signs of HEART ATTACK Call 911 if you have these symptoms: 
? Chest discomfort. Most heart attacks involve discomfort in the center of the chest that lasts more than a few minutes, or that goes away and comes back. It can feel like uncomfortable pressure, squeezing, fullness, or pain. ? Discomfort in other areas of the upper body. Symptoms can include pain or discomfort in one or both arms, the back, neck, jaw, or stomach. ? Shortness of breath with or without chest discomfort. ? Other signs may include breaking out in a cold sweat, nausea, or lightheadedness. Don't wait more than five minutes to call [address]! Fast action can save your life. Calling 911 is almost always the fastest way to get lifesaving treatment. Emergency Medical Services staff can begin treatment when they arrive  up to an hour sooner than if someone gets to the hospital by car. The discharge information has been reviewed with the patient and spouse. The patient and spouse verbalized understanding. Discharge medications reviewed with the patient and spouse and appropriate educational materials and side effects teaching were provided. Discharge Orders None ACO Transitions of Care Introducing [employer] [address] Moreno offers a voluntary care coordination program to provide high quality service and care to [facility] fee-for-service beneficiaries. Santhosh Quinn was designed to help you enhance your health and well-being through the following services: ? Transitions of Care  support for individuals who are transitioning from one care setting to another (example: Hospital to home). ? Chronic and Complex Care Coordination  support for individuals and caregivers of those with serious or chronic illnesses or with more than one chronic (ongoing) condition and those who take a number of different medications. If you meet specific medical criteria, a [address] [place] [address] Rd may call you directly to coordinate your care with your primary care physician and your other care providers. For questions about the [facility] programs, please, contact your physicians office. For general questions or additional information about Accountable Care Organizations: 
Please visit www.medicare.gov/acos. html or call 1-800-MEDICARE (2-944.559.6179) TTY users should call 4-836.422.6984. Introducing [facility]! Dear Britt Griffin: 
Thank you for requesting a Link To Media account. Our records indicate that you already have an active Link To Media account. You can access your account anytime at [email or web address] Office [email or web address] [employer]/Your Office Agent Did you know that you can access your hospital and ER discharge instructions at any time in Link To Media? You can also review all of your test results from your hospital stay or ER visit. Additional Information If you have questions, please visit the Frequently Asked Questions section of the Link To Media website at [email or web address] Office [email or web address] [employer]/Your Office [email or web address] Remember, Link To Media is NOT to be used for urgent needs. For medical emergencies, dial 911. Now available from your iPhone and Android! General Information Please provide this summary of care documentation to your next provider. Patient Signature:  ____________________________________________________________ Date:  ____________________________________________________________  
  
Asya Pane Provider Signature:  ____________________________________________________________ Date:  ____________________________________________________________

## 2017-08-28 NOTE — ANESTHESIA POSTPROCEDURE EVALUATION
Post-Anesthesia Evaluation & Assessment    Visit Vitals    /61    Pulse 88    Temp 36.2 °C (97.2 °F)    Resp 19    Ht 5' 4\" (1.626 m)    Wt 59 kg (130 lb 2 oz)    SpO2 100%    BMI 22.34 kg/m2       Post-operative hydration adequate. Pain score (VAS): 0 Pain Scale 1: Numeric (0 - 10) (08/28/17 1056)  Pain Intensity 1: 0 (08/28/17 1056)   Managed. Mental status & Level of consciousness: returned to baseline    Neurological status: returned to baseline    Pulmonary status: airway patent, oxygen given as needed. Complications related to anesthesia: none    Patient has met all discharge requirements.     Additional comments:        Judy Cummings MD  August 28, 2017

## 2017-09-15 ENCOUNTER — OFFICE VISIT (OUTPATIENT)
Dept: INTERNAL MEDICINE CLINIC | Age: 72
End: 2017-09-15

## 2017-09-15 VITALS
WEIGHT: 130 LBS | HEART RATE: 81 BPM | HEIGHT: 64 IN | OXYGEN SATURATION: 100 % | RESPIRATION RATE: 16 BRPM | TEMPERATURE: 98.1 F | SYSTOLIC BLOOD PRESSURE: 152 MMHG | BODY MASS INDEX: 22.2 KG/M2 | DIASTOLIC BLOOD PRESSURE: 84 MMHG

## 2017-09-15 DIAGNOSIS — M79.7 PRIMARY FIBROMYALGIA SYNDROME: ICD-10-CM

## 2017-09-15 DIAGNOSIS — Z23 ENCOUNTER FOR IMMUNIZATION: ICD-10-CM

## 2017-09-15 DIAGNOSIS — F41.9 ANXIETY DISORDER, UNSPECIFIED TYPE: ICD-10-CM

## 2017-09-15 DIAGNOSIS — K29.00 ACUTE SUPERFICIAL GASTRITIS WITHOUT HEMORRHAGE: Primary | ICD-10-CM

## 2017-09-15 RX ORDER — PANTOPRAZOLE SODIUM 40 MG/1
TABLET, DELAYED RELEASE ORAL
Refills: 5 | COMMUNITY
Start: 2017-08-28 | End: 2018-01-03

## 2017-09-15 RX ORDER — AA/PROT/LYSINE/METHIO/VIT C/B6 50-12.5 MG
TABLET ORAL
COMMUNITY
End: 2018-06-24 | Stop reason: ALTCHOICE

## 2017-09-15 RX ORDER — MENTHOL
1000 GEL (GRAM) TOPICAL DAILY
COMMUNITY
End: 2018-08-02 | Stop reason: ALTCHOICE

## 2017-09-15 NOTE — PATIENT INSTRUCTIONS
Medicare Wellness Visit, Female    The best way to improve and maintain good health is to have a healthy lifestyle by eating a well-balanced diet, exercising regularly, limiting alcohol and stopping smoking. Regular visits with your physician or non-physician health care provider also support your good health. Preventive screening tests can find health problems before they become diseases or illnesses. Preventive services such as immunizations prevent serious infections. All people over age 72 should have a Pneumovax and a Prevnar-13 shot to prevent potentially life threatening infections with the pneumococcus bacteria, a common cause of pneumonia. These are once in a lifetime unless you and your provider decide differently. All people over 65 should have a yearly influenza vaccine or \"flu\" shot. This does not prevent infection with cold viruses but has been proven to prevent hospitalization and death from influenza. Although Medicare part B \"regular Medicare\" currently only covers tetanus vaccination in the context of an injury, a tetanus vaccine (Tdap or Td) is recommended every 10 years. A shingles vaccine is recommended once in a lifetime after age 61. The Shingles vaccine is also not covered by Medicare part B. Note, however, that both the Shingles vaccine and Tdap/Td are generally covered by secondary carriers. Please check your coverage and out of pocket expenses. Consider contacting your local health department because it may stock these vaccines for a reasonable charge. We currently have documentation of the following immunization history for you:  Immunization History   Administered Date(s) Administered    Influenza High Dose Vaccine PF 10/25/2016    Influenza Vaccine (Quad) PF 10/26/2015    Pneumococcal Conjugate (PCV-13) 10/25/2016    Tdap 10/01/2013, 10/26/2015       Screening for infection with Hepatitis C is recommended for anyone born between [birth year] through 1965. The table at the bottom of this document indicates the status of this and other preventive services. A bone mass density test (DEXA) to screen for osteoporosis or thinning of the bones should be done at least once after age 72 and may be done up to every 2 years as determined by you and your health care provider. The most recent DEXA we have on file for you is:      Screening for diabetes mellitus with a blood sugar test (glucose) should be done at least every 3 years until age 79. You and your health care provider may decide whether to continue screening after age 79. The most recent blood glucose we have on file for you is:   Lab Results   Component Value Date/Time    Glucose 88 09/26/2016 01:20 PM         Glaucoma is a disease of the eye due to increased ocular pressure that can lead to blindness. People with risk factors for glaucoma ( race, diabetes, family history) should be screened at least every 2 years by an eye professional.     Cardiovascular screening tests that check for elevated lipids or cholesterol (fatty part of blood) which can lead to heart disease and strokes should be done every 4-6 years through age 79. You and your health care provider may decide whether to continue screening after age 79. The most recent lipid panel we have on file for you is:   Lab Results   Component Value Date/Time    Cholesterol, total 145 11/11/2016 12:45 PM    HDL Cholesterol 72 11/11/2016 12:45 PM    LDL, calculated 56.4 11/11/2016 12:45 PM    VLDL, calculated 16.6 11/11/2016 12:45 PM    Triglyceride 83 11/11/2016 12:45 PM    CHOL/HDL Ratio 2.0 11/11/2016 12:45 PM       Colorectal cancer screening that evaluates for blood or polyps in your colon for people with average risk should be done yearly as a stool test, every five years as a flexible sigmoidoscope or every 10 years as a colonoscopy up to age 76. You and your health care provider may decide whether to continue screening after age 76.     Breast cancer screening with a mammogram is recommended at least once every 2 years  for women age 54-69. You and your health care provider may decide whether to continue screening after age 76. The most recent mammogram we have on file for you is:   [facility] Results (most recent):  No results found for this or any previous visit. Screening for cervical cancer with a pap smear is recommended for all women with a cervix until age 72. The frequency of this test is based on the details of her prior pap smear testing. You and your health care provider may decide whether to continue screening after age 72. If you have been a smoker or had family history of abdominal aortic aneurysms, you and your provider may decide to schedule an ultrasound test of your aorta. If you have questions regarding this please ask your health care provider    People ages 54 to [de-identified] years who have smoked the equivalent of 1 pack per day for 30 years or more may benefit from screening for lung cancer with a yearly low dose CT scan until they have been non smokers for 15 years. Please ask your health care provider if you have any questions    Your Medicare Wellness Exam is recommended annually.

## 2017-09-15 NOTE — PROGRESS NOTES
1. Have you been to the ER, urgent care clinic since your last visit? Hospitalized since your last visit? No    2. Have you seen or consulted any other health care providers outside of the [address] [place] [address] since your last visit? Include any pap smears or colon screening.  No

## 2017-09-18 DIAGNOSIS — F41.9 ANXIETY DISORDER, UNSPECIFIED TYPE: ICD-10-CM

## 2017-09-18 RX ORDER — LORAZEPAM 0.5 MG/1
TABLET ORAL
Qty: 90 TAB | Refills: 0 | Status: SHIPPED | OUTPATIENT
Start: 2017-09-18 | End: 2017-09-19 | Stop reason: SDUPTHER

## 2017-09-18 RX ORDER — ALBUTEROL SULFATE 90 UG/1
2 AEROSOL, METERED RESPIRATORY (INHALATION)
Qty: 1 INHALER | Refills: 5 | Status: SHIPPED | OUTPATIENT
Start: 2017-09-18 | End: 2018-01-21

## 2017-09-18 NOTE — PROGRESS NOTES
The patient presents to the office today with the chief complaint of abdominal pain    HPI    The patient recently had moderately severe epigastric pain - work up showed mild gastritis. The patient is now on Protonix. There symptoms are much improved. The patient continues with anxiety which has improved as her GI symptoms have improved. The patient has fatigue from fibromyalgia which is also doing better      Review of Systems   Respiratory: Negative for shortness of breath. Cardiovascular: Negative for chest pain and leg swelling. Allergies   Allergen Reactions    Antihistimine Swelling    Nitrofurantoin Nausea Only       Current Outpatient Prescriptions   Medication Sig Dispense Refill    HAWTHNONI BERRY PO Take  by mouth.  vitamin e (E GEMS) 1,000 unit capsule Take 1,000 Units by mouth daily.  coenzyme q10 (CO Q-10) 10 mg cap Take  by mouth.  ALFALFA PO Take  by mouth.  pantoprazole (PROTONIX) 40 mg tablet TK 1 T PO D  5    LORazepam (ATIVAN) 0.5 mg tablet TAKE 1 TABLET BY MOUTH THREE TIMES DAILY 90 Tab 0    escitalopram oxalate (LEXAPRO) 10 mg tablet TAKE 1 TABLET BY MOUTH DAILY 90 Tab 3    rosuvastatin (CRESTOR) 20 mg tablet TK 1 T PO QD  5    hydroxychloroquine (PLAQUENIL) 200 mg tablet Take 200 mg by mouth two (2) times a day.  aspirin 81 mg tablet Take 81 mg by mouth daily.  multivitamin (ONE A DAY) tablet Take 1 Tab by mouth daily.  omega-3 fatty acids-vitamin e (FISH OIL) 1,000 mg Cap Take 1 Cap by mouth daily.  CALCIUM CARB/VIT D3/MINERALS (CALCIUM 600 + MINERALS PO) Take 600 mg by mouth daily.          Past Medical History:   Diagnosis Date    Acute upper respiratory infection     Anxiety disorder     Costal chondritis     Dyslipidemia     Dyspnea     Fibromyalgia     Hypotension     Jaw pain     Mitral valve prolapse     Pernicious anemia     Primary fibromyalgia syndrome     Pure hypercholesterolemia     Quadricuspid aortic valve ??     ECHO 7/14    Rheumatoid arthritis ([facility] [address]     Visceral leishmaniasis        Past Surgical History:   Procedure Laterality Date    HX HEART CATHETERIZATION  2004    normal    HX PARTIAL HYSTERECTOMY      HX TONSILLECTOMY         Social History     Social History    Marital status:      Spouse name: N/A    Number of children: N/A    Years of education: N/A     Occupational History    Not on file. Social History Main Topics    Smoking status: Former Smoker     Packs/day: 0.25     Quit date: 4/15/1975    Smokeless tobacco: Never Used    Alcohol use No      Comment: occasional    Drug use: No    Sexual activity: Yes     Partners: Male     Other Topics Concern    Not on file     Social History Narrative       Patient does not have an advanced directive on file    Visit Vitals    /84 (BP 1 Location: Left arm, BP Patient Position: Sitting)    Pulse 81    Temp 98.1 °F (36.7 °C) (Tympanic)    Resp 16    Ht 5' 4\" (1.626 m)    Wt 130 lb (59 kg)    LMP  (Approximate)    SpO2 100%    BMI 22.31 kg/m2       Physical Exam   No Cervical Lymphadenopathy  No Supraclavicular Lymphadenopathy  Thyroid is Normal  Lungs are clear to ausculation and percussion  Heart:  S1 S2 are normal, No gallops, No mummers  No Carotid Bruits  Abdomen:  Normal Bowel Sounds. No tenderness. No masses. No Hepatomegaly or Splenomegly  LE:  Strong Pedal Pulses.   No Edema      BMI:  OK    Admission on 08/28/2017, Discharged on 08/28/2017   Component Date Value Ref Range Status    Ventricular Rate 08/28/2017 74  BPM Final    Atrial Rate 08/28/2017 74  BPM Final    P-R Interval 08/28/2017 144  ms Final    QRS Duration 08/28/2017 88  ms Final    Q-T Interval 08/28/2017 386  ms Final    QTC Calculation (Bezet) 08/28/2017 428  ms Final    Calculated P Axis 08/28/2017 4  degrees Final    Calculated R Axis 08/28/2017 -11  degrees Final    Calculated T Axis 08/28/2017 35  degrees Final    Diagnosis 08/28/2017 Final                    Value:Normal sinus rhythm  Septal infarct (cited on or before 19-AUG-2010)  Abnormal ECG  When compared with ECG of 19-AUG-2010 09:19,  Questionable change in initial forces of Septal leads  Confirmed by Casey Zuñiga ([id number]) on 8/28/2017 1:09:01 PM         .No results found for any visits on 09/15/17. Assessment / Plan      ICD-10-CM ICD-9-CM    1. Acute superficial gastritis without hemorrhage K29.00 535.40    2. Primary fibromyalgia syndrome M79.7 729.1    3. Anxiety disorder, unspecified type F41.9 300.00      she was advised to continue her maintenance medications      Follow-up Disposition:  Return in about 3 months (around 12/15/2017). I asked Natalie Guzman if she has any questions and I answered the questions. Beena Watson states that she understands the treatment plan and agrees with the treatment plan

## 2017-09-18 NOTE — TELEPHONE ENCOUNTER
Patient's  called to request a refill for her Pro air inhaler which I could not find on med list     ALSO ON HER   Requested Prescriptions     Pending Prescriptions Disp Refills    LORazepam (ATIVAN) 0.5 mg tablet 90 Tab 0

## 2017-09-19 DIAGNOSIS — F41.9 ANXIETY DISORDER, UNSPECIFIED TYPE: ICD-10-CM

## 2017-09-19 RX ORDER — LORAZEPAM 0.5 MG/1
TABLET ORAL
Qty: 90 TAB | Refills: 0 | Status: SHIPPED | OUTPATIENT
Start: 2017-09-19 | End: 2017-10-27 | Stop reason: SDUPTHER

## 2017-10-04 ENCOUNTER — HOSPITAL ENCOUNTER (OUTPATIENT)
Dept: LAB | Age: 72
Discharge: HOME | End: 2017-10-04
Payer: MEDICARE

## 2017-10-04 PROCEDURE — 82085 ASSAY OF ALDOLASE: CPT | Performed by: INTERNAL MEDICINE

## 2017-10-04 PROCEDURE — 86602 ANTINOMYCES ANTIBODY: CPT | Performed by: INTERNAL MEDICINE

## 2017-10-04 PROCEDURE — 36415 COLL VENOUS BLD VENIPUNCTURE: CPT | Performed by: INTERNAL MEDICINE

## 2017-10-05 LAB — ALDOLASE SERPL-CCNC: 4.1 U/L (ref 3.3–10.3)

## 2017-10-09 LAB
A FLAVUS IGE QN: NEGATIVE
A FUMIGATUS # 2 AB, 660126: NEGATIVE
A FUMIGATUS # 3 AB, 660142: NEGATIVE
LACEYELLA SACCHARI AB SER QL: NEGATIVE
S VIRIDIS AB SER-ACNC: NEGATIVE
T CANDIDUS AB SER QL: NEGATIVE

## 2017-10-10 ENCOUNTER — HOSPITAL ENCOUNTER (OUTPATIENT)
Dept: CT IMAGING | Age: 72
Discharge: HOME | End: 2017-10-10
Attending: INTERNAL MEDICINE
Payer: MEDICARE

## 2017-10-10 DIAGNOSIS — J84.09 SIMPLE PULMONARY ALVEOLITIS (HCC): ICD-10-CM

## 2017-10-10 PROCEDURE — 71250 CT THORAX DX C-: CPT

## 2017-10-27 DIAGNOSIS — F41.9 ANXIETY DISORDER, UNSPECIFIED TYPE: ICD-10-CM

## 2017-10-27 RX ORDER — LORAZEPAM 0.5 MG/1
TABLET ORAL
Qty: 90 TAB | Refills: 0 | Status: SHIPPED | OUTPATIENT
Start: 2017-10-27 | End: 2017-10-29 | Stop reason: SDUPTHER

## 2017-10-29 DIAGNOSIS — F41.9 ANXIETY DISORDER, UNSPECIFIED TYPE: ICD-10-CM

## 2017-10-29 RX ORDER — LORAZEPAM 0.5 MG/1
TABLET ORAL
Qty: 90 TAB | Refills: 0 | Status: SHIPPED | OUTPATIENT
Start: 2017-10-29 | End: 2017-12-11 | Stop reason: SDUPTHER

## 2017-10-30 ENCOUNTER — TELEPHONE (OUTPATIENT)
Dept: INTERNAL MEDICINE CLINIC | Age: 72
End: 2017-10-30

## 2017-11-07 DIAGNOSIS — F32.A DEPRESSION, UNSPECIFIED DEPRESSION TYPE: ICD-10-CM

## 2017-11-07 RX ORDER — ESCITALOPRAM OXALATE 10 MG/1
TABLET ORAL
Qty: 90 TAB | Refills: 0 | Status: SHIPPED | OUTPATIENT
Start: 2017-11-07 | End: 2018-04-26

## 2017-12-11 DIAGNOSIS — F41.9 ANXIETY DISORDER, UNSPECIFIED TYPE: ICD-10-CM

## 2017-12-11 RX ORDER — LORAZEPAM 0.5 MG/1
TABLET ORAL
Qty: 90 TAB | Refills: 0 | Status: SHIPPED | OUTPATIENT
Start: 2017-12-11 | End: 2018-01-24 | Stop reason: SDUPTHER

## 2018-01-21 ENCOUNTER — APPOINTMENT (OUTPATIENT)
Dept: GENERAL RADIOLOGY | Age: 73
End: 2018-01-21
Attending: PHYSICIAN ASSISTANT
Payer: MEDICARE

## 2018-01-21 ENCOUNTER — HOSPITAL ENCOUNTER (EMERGENCY)
Age: 73
Discharge: HOME | End: 2018-01-21
Attending: EMERGENCY MEDICINE | Admitting: EMERGENCY MEDICINE
Payer: MEDICARE

## 2018-01-21 VITALS
TEMPERATURE: 98.6 F | SYSTOLIC BLOOD PRESSURE: 114 MMHG | RESPIRATION RATE: 20 BRPM | OXYGEN SATURATION: 96 % | HEART RATE: 85 BPM | WEIGHT: 130 LBS | BODY MASS INDEX: 21.66 KG/M2 | DIASTOLIC BLOOD PRESSURE: 70 MMHG | HEIGHT: 65 IN

## 2018-01-21 DIAGNOSIS — L01.00 IMPETIGO: ICD-10-CM

## 2018-01-21 DIAGNOSIS — J01.10 ACUTE NON-RECURRENT FRONTAL SINUSITIS: Primary | ICD-10-CM

## 2018-01-21 PROCEDURE — 99282 EMERGENCY DEPT VISIT SF MDM: CPT

## 2018-01-21 RX ORDER — AMOXICILLIN AND CLAVULANATE POTASSIUM 875; 125 MG/1; MG/1
1 TABLET, FILM COATED ORAL 2 TIMES DAILY
Qty: 20 TAB | Refills: 0 | Status: SHIPPED | OUTPATIENT
Start: 2018-01-21 | End: 2018-01-31

## 2018-01-21 RX ORDER — METHYLPREDNISOLONE 4 MG/1
TABLET ORAL
Qty: 1 DOSE PACK | Refills: 0 | Status: SHIPPED | OUTPATIENT
Start: 2018-01-21 | End: 2018-02-26 | Stop reason: ALTCHOICE

## 2018-01-21 NOTE — ED NOTES
[facility] Hawk  [facility] EMERGENCY DEPT        PHYSICIAN: Raquel Prado MD  NURSE PRACTITIONER: Radha Joyce NP  PCP: Akila Aguilar MD    CHIEF COMPLAINT:   Chief Complaint   Patient presents with    Chest Congestion    Nasal Congestion    Rash           2:47 PM Yaz Tolentino is a 67 y.o. female who presents to the emergency department with c/o sinus congestion and rash above lip onset 5 days. Pt denies fever or cough. Pt states her ears feel full. She states she is babysitting her grand children tomorrow and wanted to be sure she was not contagious. No other complaints. Denies chest pain. Review of Systems   Constitutional: Negative for chills, fever and malaise/fatigue. HENT: Positive for congestion, sinus pain and sore throat. Respiratory: Positive for cough. Negative for shortness of breath and wheezing. Cardiovascular: Negative for chest pain and palpitations. Gastrointestinal: Negative for abdominal pain, diarrhea, nausea and vomiting. Genitourinary: Negative for dysuria. Musculoskeletal: Negative for falls and myalgias. Skin: Positive for rash. Yellow honey crusted rash noted above lip   Neurological: Negative for dizziness and headaches. HISTORY:  Active Ambulatory Problems     Diagnosis Date Noted    Heart murmur     Dyslipidemia     Quadricuspid aortic valve ? ?     Dizziness     Mitral valve prolapse     Costal chondritis     Dyspnea     Pure hypercholesterolemia     Pernicious anemia     Anxiety disorder     Primary fibromyalgia syndrome     Contusion of right scapula 04/15/2016    Depression 10/25/2016     Resolved Ambulatory Problems     Diagnosis Date Noted    Unspecified chest pain 03/01/2011    SOB (shortness of breath) on exertion 03/01/2011    Chest pain     Hypotension     Jaw pain     Visceral leishmaniasis     Acute upper respiratory infection      Past Medical History:   Diagnosis Date    Acute upper respiratory infection     Anxiety disorder     Arthritis     Costal chondritis     Dyslipidemia     Dyspnea     Fibromyalgia     Hypotension     Interstitial lung disease (HCC)     Jaw pain     Mitral valve prolapse     Pernicious anemia     Primary fibromyalgia syndrome     Pure hypercholesterolemia     Quadricuspid aortic valve ? ?  Rheumatoid arthritis(714.0)     Visceral leishmaniasis        Past Surgical History:   Procedure Laterality Date    HX CATARACT REMOVAL Bilateral     HX HEART CATHETERIZATION  2004    normal    HX PARTIAL HYSTERECTOMY      HX TONSILLECTOMY         Social History     Social History    Marital status:      Spouse name: N/A    Number of children: N/A    Years of education: N/A     Occupational History    Not on file. Social History Main Topics    Smoking status: Former Smoker     Packs/day: 0.25     Quit date: 4/15/1975    Smokeless tobacco: Never Used    Alcohol use 0.0 oz/week     0 Standard drinks or equivalent per week      Comment: occasional    Drug use: No    Sexual activity: Yes     Partners: Male     Other Topics Concern    Not on file     Social History Narrative       MEDICATION LIST:  No current facility-administered medications for this encounter. Current Outpatient Prescriptions   Medication Sig    amoxicillin-clavulanate (AUGMENTIN) 875-125 mg per tablet Take 1 Tab by mouth two (2) times a day for 10 days.  methylPREDNISolone (MEDROL, ALICE,) 4 mg tablet Take as directed.  rosuvastatin (CRESTOR) 20 mg tablet Take 20 mg by mouth daily (after dinner).  LORazepam (ATIVAN) 0.5 mg tablet TAKE 1 TABLET BY MOUTH THREE TIMES DAILY    escitalopram oxalate (LEXAPRO) 10 mg tablet TAKE 1 TABLET BY MOUTH DAILY    HAWTHORN BERRY PO Take  by mouth.  vitamin e (E GEMS) 1,000 unit capsule Take 1,000 Units by mouth daily.  coenzyme q10 (CO Q-10) 10 mg cap Take  by mouth.  ALFALFA PO Take  by mouth.     hydroxychloroquine (PLAQUENIL) 200 mg tablet Take 200 mg by mouth two (2) times a day.  aspirin 81 mg tablet Take 81 mg by mouth daily.  multivitamin (ONE A DAY) tablet Take 1 Tab by mouth daily.  omega-3 fatty acids-vitamin e (FISH OIL) 1,000 mg Cap Take 1 Cap by mouth daily.  CALCIUM CARB/VIT D3/MINERALS (CALCIUM 600 + MINERALS PO) Take 600 mg by mouth daily. PHYSICAL EXAM:  Patient Vitals for the past 12 hrs:   Temp Pulse Resp BP SpO2   01/21/18 1440 98.6 °F (37 °C) 85 20 114/70 96 %       Physical Exam   Constitutional: She is oriented to person, place, and time and well-developed, well-nourished, and in no distress. HENT:   Head: Normocephalic. Head is without contusion. Right Ear: Tympanic membrane is bulging. Left Ear: Tympanic membrane is bulging. Nose: Rhinorrhea and sinus tenderness present. Right sinus exhibits maxillary sinus tenderness and frontal sinus tenderness. Left sinus exhibits maxillary sinus tenderness and frontal sinus tenderness. Mouth/Throat: Uvula is midline and mucous membranes are normal. Posterior oropharyngeal erythema present. Eyes: EOM are normal. Pupils are equal, round, and reactive to light. Neck: Normal range of motion. Neck supple. Cardiovascular: Normal rate, regular rhythm, normal heart sounds and intact distal pulses. Pulmonary/Chest: Effort normal and breath sounds normal. No respiratory distress. Abdominal: Soft. Bowel sounds are normal. She exhibits no distension. There is no tenderness. Musculoskeletal: Normal range of motion. Neurological: She is alert and oriented to person, place, and time. Skin: Skin is warm and dry. Psychiatric: Affect normal.   Nursing note and vitals reviewed. Orders Placed:  Orders Placed This Encounter    amoxicillin-clavulanate (AUGMENTIN) 875-125 mg per tablet     Sig: Take 1 Tab by mouth two (2) times a day for 10 days.      Dispense:  20 Tab     Refill:  0    methylPREDNISolone (MEDROL, ALICE,) 4 mg tablet     Sig: Take as directed. Dispense:  1 Dose Pack     Refill:  0       LABS:  No results found for this or any previous visit (from the past 24 hour(s)). RADIOLOGY:  No results found. ED PROCEDURES:   none    ECG: none  ED COURSE/MDM:  Started Augmentin and steroids. Pt declined CXR. DDX Included:  2:59 PM  Based on the patient's history of presenting illness, physical examination, laboratory, radiographic, and/or tests results, and response to medical interventions, I believe the patient most likely is having acute sinusitis. SPECIFIC PATIENT INSTRUCTIONS FROM THE PHYSICIAN WHO TREATED YOU IN THE ER TODAY:  1. Augmentin as prescribed. 2. Return if worse. 3. Reevaluation by your doctor if symptoms not improving with above regimen after 1 week. Discharge Diagnosis    ICD-10-CM ICD-9-CM   1. Acute non-recurrent frontal sinusitis J01.10 461.1   2. Impetigo L01.00 684         DISPOSITION: Discharge home. Follow-up Information     Follow up With Details Comments Contact Info    Eve Weiss MD In 3 days As needed [phone] [id number]  [phone]            Current Discharge Medication List      START taking these medications    Details   amoxicillin-clavulanate (AUGMENTIN) 875-125 mg per tablet Take 1 Tab by mouth two (2) times a day for 10 days. Qty: 20 Tab, Refills: 0      methylPREDNISolone (MEDROL, ALICE,) 4 mg tablet Take as directed.   Qty: 1 Dose Pack, Refills: 0               Eric Bergman NP

## 2018-01-21 NOTE — ED NOTES
Pt states ready for discharge. Pt states she will follow up with PCP as instructed by provider. Pt appears in NOAD. I have reviewed discharge instructions with the patient. Prescriptions were reviewed with patient instructed not to drink alcohol, drive a car, or operate heavy machinery while taking this medicine. The patient verbalized understanding. Patient seen leaving ED ambulatory without difficulty or need for assistance, with S/O in no sign of distress. Patient armband removed and shredded. Current Discharge Medication List      START taking these medications    Details   amoxicillin-clavulanate (AUGMENTIN) 875-125 mg per tablet Take 1 Tab by mouth two (2) times a day for 10 days. Qty: 20 Tab, Refills: 0      methylPREDNISolone (MEDROL, ALICE,) 4 mg tablet Take as directed.   Qty: 1 Dose Pack, Refills: 0

## 2018-01-21 NOTE — DISCHARGE INSTRUCTIONS
Impetigo: Care Instructions  Your Care Instructions  Impetigo (say \"lt-gra-EC-go\") is a skin infection caused by bacteria. It causes blisters that break and become oozing, yellow, crusty sores. Impetigo can be anywhere on the body. Scratching the sores may spread the infection to other parts of the body. You can also spread it to others through close contact or when you share towels, clothing, and other items. Prescription antibiotic ointment or pills can usually cure impetigo. (After a day of antibiotics, the infection should not spread.)  Follow-up care is a key part of your treatment and safety. Be sure to make and go to all appointments, and call your doctor if you are having problems. It's also a good idea to know your test results and keep a list of the medicines you take. How can you care for yourself at home? · Apply antibiotic ointment exactly as instructed. · If your doctor prescribed antibiotic pills, take them as directed. Do not stop using them just because you feel better. You need to take the full course of antibiotics. · Gently wash the sores with soap and water each day. If crusts form, your doctor may advise you to soften or remove the crusts. You can do this by soaking them in warm water and patting them dry. This can help the cream or ointment treat impetigo. · After you touch the area, wash your hands with soap and water. Or you can use an alcohol-based hand . · Don't share items such as towels, sheets, and clothing until the infection is gone. · Wash anything that may have touched the infected area. · Try to avoid scratching the area. When should you call for help? Call your doctor now or seek immediate medical care if:  ? · You have symptoms of a worse infection, such as:  ¨ Increased pain, swelling, warmth, or redness. ¨ Red streaks leading from the area. ¨ Pus draining from the area. ¨ A fever. ? · Impetigo gets worse or spreads to other areas. ? Watch closely for changes in your health, and be sure to contact your doctor if:  ? · You do not get better as expected. Where can you learn more? Go to http://benja-alex.info/. Enter K343 in the search box to learn more about \"Impetigo: Care Instructions. \"  Current as of: May 12, 2017  Content Version: 11.4  © 0134-0160 [employer]. Care instructions adapted under license by [employer] (which disclaims liability or warranty for this information). If you have questions about a medical condition or this instruction, always ask your healthcare professional. [place] [address] any warranty or liability for your use of this information. Sinusitis: Care Instructions  Your Care Instructions    Sinusitis is an infection of the lining of the sinus cavities in your head. Sinusitis often follows a cold. It causes pain and pressure in your head and face. In most cases, sinusitis gets better on its own in 1 to 2 weeks. But some mild symptoms may last for several weeks. Sometimes antibiotics are needed. Follow-up care is a key part of your treatment and safety. Be sure to make and go to all appointments, and call your doctor if you are having problems. It's also a good idea to know your test results and keep a list of the medicines you take. How can you care for yourself at home? · Take an over-the-counter pain medicine, such as acetaminophen (Tylenol), ibuprofen (Advil, Motrin), or naproxen (Aleve). Read and follow all instructions on the label. · If the doctor prescribed antibiotics, take them as directed. Do not stop taking them just because you feel better. You need to take the full course of antibiotics. · Be careful when taking over-the-counter cold or flu medicines and Tylenol at the same time. Many of these medicines have acetaminophen, which is Tylenol. Read the labels to make sure that you are not taking more than the recommended dose.  Too much acetaminophen (Tylenol) can be harmful. · Breathe warm, moist air from a steamy shower, a hot bath, or a sink filled with hot water. Avoid cold, dry air. Using a humidifier in your home may help. Follow the directions for cleaning the machine. · Use saline (saltwater) nasal washes to help keep your nasal passages open and wash out mucus and bacteria. You can buy saline nose drops at a grocery store or drugstore. Or you can make your own at home by adding 1 teaspoon of salt and 1 teaspoon of baking soda to 2 cups of distilled water. If you make your own, fill a bulb syringe with the solution, insert the tip into your nostril, and squeeze gently. Calixto Garvin your nose. · Put a hot, wet towel or a warm gel pack on your face 3 or 4 times a day for 5 to 10 minutes each time. · Try a decongestant nasal spray like oxymetazoline (Afrin). Do not use it for more than 3 days in a row. Using it for more than 3 days can make your congestion worse. When should you call for help? Call your doctor now or seek immediate medical care if:  ? · You have new or worse swelling or redness in your face or around your eyes. ? · You have a new or higher fever. ? Watch closely for changes in your health, and be sure to contact your doctor if:  ? · You have new or worse facial pain. ? · The mucus from your nose becomes thicker (like pus) or has new blood in it. ? · You are not getting better as expected. Where can you learn more? Go to http://benja-alex.info/. Enter H358 in the search box to learn more about \"Sinusitis: Care Instructions. \"  Current as of: May 12, 2017  Content Version: 11.4  © 3928-5198 [employer]. Care instructions adapted under license by [employer] (which disclaims liability or warranty for this information).  If you have questions about a medical condition or this instruction, always ask your healthcare professional. August Pope disclaims any warranty or liability for your use of this information.

## 2018-01-21 NOTE — ED TRIAGE NOTES
Pt c/o cough/chest and nasal congestion for 5 days. States was SOB day before yesterday.  has been spitting up blood.  Also has a rash below her nose

## 2018-01-23 ENCOUNTER — PATIENT OUTREACH (OUTPATIENT)
Dept: INTERNAL MEDICINE CLINIC | Age: 73
End: 2018-01-23

## 2018-01-23 NOTE — PROGRESS NOTES
Transitions of [address] was seen in [facility] ED on 1/21/18 for sinusitis and impetigo and discharged to home. Medical History:     Past Medical History:   Diagnosis Date    Acute upper respiratory infection     Anxiety disorder     Arthritis     Costal chondritis     Dyslipidemia     Dyspnea     Fibromyalgia     Hypotension     Interstitial lung disease (HCC)     Jaw pain     Mitral valve prolapse     pt denies this    Pernicious anemia     Primary fibromyalgia syndrome     Pure hypercholesterolemia     Quadricuspid aortic valve ?? ECHO 7/14    Rheumatoid arthritis(714.0)     Visceral leishmaniasis        Patient presenting symptoms: nasal congestion, ear fullness, rash    Discharge Diagnoses:    Acute non-recurrent frontal sinusitis    Impetigo      Significant Lab/Diagnostic Findings: none    New medications at discharge include:  Medrol, Augmentin     Support System consists of: spouse    Future Plan for Patient/Discharge Instructions:  F/U with PCP as needed. Adherence to previous treatment and likelihood for f/u:     Called  on 1/23/18 and verified with 2 identifiers. Reached spouse, Kay Redmond (HIPPA verified,) who stated the patient was not available. Per spouse patient is feeling some better and is taking newly prescribed Medrol and Augmentin. Advised appointment with PCP if no improvement in 24-48 hours. Spouse voiced understanding. Opportunity to ask questions was provided. Contact information was given for future questions, concerns or assistance. Will follow. Goals Addressed        Post ED     Knowledge and adherence to medication plan.                   Plan:  Discuss taking new medications as prescribed-done 1/23/18

## 2018-01-24 DIAGNOSIS — F41.9 ANXIETY DISORDER, UNSPECIFIED TYPE: ICD-10-CM

## 2018-01-24 RX ORDER — LORAZEPAM 0.5 MG/1
TABLET ORAL
Qty: 90 TAB | Refills: 0 | Status: SHIPPED | OUTPATIENT
Start: 2018-01-24 | End: 2018-03-27 | Stop reason: SDUPTHER

## 2018-02-09 ENCOUNTER — ANESTHESIA EVENT (OUTPATIENT)
Dept: ENDOSCOPY | Age: 73
End: 2018-02-09
Payer: MEDICARE

## 2018-02-12 ENCOUNTER — ANESTHESIA (OUTPATIENT)
Dept: ENDOSCOPY | Age: 73
End: 2018-02-12
Payer: MEDICARE

## 2018-02-12 ENCOUNTER — HOSPITAL ENCOUNTER (OUTPATIENT)
Age: 73
Setting detail: OUTPATIENT SURGERY
Discharge: HOME | End: 2018-02-12
Attending: INTERNAL MEDICINE | Admitting: INTERNAL MEDICINE
Payer: MEDICARE

## 2018-02-12 VITALS
SYSTOLIC BLOOD PRESSURE: 103 MMHG | RESPIRATION RATE: 16 BRPM | WEIGHT: 130.13 LBS | BODY MASS INDEX: 21.68 KG/M2 | DIASTOLIC BLOOD PRESSURE: 66 MMHG | HEIGHT: 65 IN | OXYGEN SATURATION: 96 % | HEART RATE: 77 BPM | TEMPERATURE: 97 F

## 2018-02-12 PROCEDURE — 76060000031 HC ANESTHESIA FIRST 0.5 HR: Performed by: INTERNAL MEDICINE

## 2018-02-12 PROCEDURE — 74011250636 HC RX REV CODE- 250/636: Performed by: NURSE ANESTHETIST, CERTIFIED REGISTERED

## 2018-02-12 PROCEDURE — 74011000250 HC RX REV CODE- 250

## 2018-02-12 PROCEDURE — 76040000019: Performed by: INTERNAL MEDICINE

## 2018-02-12 PROCEDURE — 74011000250 HC RX REV CODE- 250: Performed by: NURSE ANESTHETIST, CERTIFIED REGISTERED

## 2018-02-12 PROCEDURE — 77030008565 HC TBNG SUC IRR ERBE -B: Performed by: INTERNAL MEDICINE

## 2018-02-12 PROCEDURE — 74011250636 HC RX REV CODE- 250/636

## 2018-02-12 PROCEDURE — 77030018846 HC SOL IRR STRL H20 ICUM -A: Performed by: INTERNAL MEDICINE

## 2018-02-12 RX ORDER — INSULIN LISPRO 100 [IU]/ML
INJECTION, SOLUTION INTRAVENOUS; SUBCUTANEOUS ONCE
Status: CANCELLED | OUTPATIENT
Start: 2018-02-12 | End: 2018-02-12

## 2018-02-12 RX ORDER — PROPOFOL 10 MG/ML
INJECTION, EMULSION INTRAVENOUS AS NEEDED
Status: DISCONTINUED | OUTPATIENT
Start: 2018-02-12 | End: 2018-02-12 | Stop reason: HOSPADM

## 2018-02-12 RX ORDER — LIDOCAINE HYDROCHLORIDE 20 MG/ML
INJECTION, SOLUTION EPIDURAL; INFILTRATION; INTRACAUDAL; PERINEURAL AS NEEDED
Status: DISCONTINUED | OUTPATIENT
Start: 2018-02-12 | End: 2018-02-12 | Stop reason: HOSPADM

## 2018-02-12 RX ORDER — SODIUM CHLORIDE 0.9 % (FLUSH) 0.9 %
5-10 SYRINGE (ML) INJECTION AS NEEDED
Status: CANCELLED | OUTPATIENT
Start: 2018-02-12

## 2018-02-12 RX ORDER — MAGNESIUM SULFATE 100 %
4 CRYSTALS MISCELLANEOUS AS NEEDED
Status: CANCELLED | OUTPATIENT
Start: 2018-02-12

## 2018-02-12 RX ORDER — DEXTROSE 50 % IN WATER (D50W) INTRAVENOUS SYRINGE
25-50 AS NEEDED
Status: CANCELLED | OUTPATIENT
Start: 2018-02-12

## 2018-02-12 RX ORDER — ONDANSETRON 2 MG/ML
4 INJECTION INTRAMUSCULAR; INTRAVENOUS ONCE
Status: CANCELLED | OUTPATIENT
Start: 2018-02-12 | End: 2018-02-12

## 2018-02-12 RX ORDER — SODIUM CHLORIDE, SODIUM LACTATE, POTASSIUM CHLORIDE, CALCIUM CHLORIDE 600; 310; 30; 20 MG/100ML; MG/100ML; MG/100ML; MG/100ML
75 INJECTION, SOLUTION INTRAVENOUS CONTINUOUS
Status: DISCONTINUED | OUTPATIENT
Start: 2018-02-13 | End: 2018-02-12 | Stop reason: HOSPADM

## 2018-02-12 RX ADMIN — FAMOTIDINE: 10 INJECTION, SOLUTION INTRAVENOUS at 08:20

## 2018-02-12 RX ADMIN — PROPOFOL 50 MG: 10 INJECTION, EMULSION INTRAVENOUS at 08:38

## 2018-02-12 RX ADMIN — SODIUM CHLORIDE, SODIUM LACTATE, POTASSIUM CHLORIDE, AND CALCIUM CHLORIDE 75 ML/HR: 600; 310; 30; 20 INJECTION, SOLUTION INTRAVENOUS at 08:21

## 2018-02-12 RX ADMIN — PROPOFOL 100 MG: 10 INJECTION, EMULSION INTRAVENOUS at 08:37

## 2018-02-12 RX ADMIN — LIDOCAINE HYDROCHLORIDE 60 MG: 20 INJECTION, SOLUTION EPIDURAL; INFILTRATION; INTRACAUDAL; PERINEURAL at 08:36

## 2018-02-12 NOTE — DISCHARGE INSTRUCTIONS
Upper GI Endoscopy: What to Expect at [address] [place] [address]  After you have an endoscopy, you will stay at the hospital or clinic for 1 to 2 hours. This will allow the medicine to wear off. You will be able to go home after your doctor or nurse checks to make sure you are not having any problems. You may have to stay overnight if you had treatment during the test. You may have a sore throat for a day or two after the test.  This care sheet gives you a general idea about what to expect after the test.  How can you care for yourself at home? Activity  · Rest as much as you need to after you go home. · You should be able to go back to your usual activities the day after the test.  Diet  · Follow your doctor's directions for eating after the test.  · Drink plenty of fluids (unless your doctor has told you not to). Medications  · If you have a sore throat the day after the test, use an over-the-counter spray to numb your throat. Follow-up care is a key part of your treatment and safety. Be sure to make and go to all appointments, and call your doctor if you are having problems. It's also a good idea to know your test results and keep a list of the medicines you take. When should you call for help? Call 911 anytime you think you may need emergency care. For example, call if:  ? · You passed out (loses consciousness). ? · You have trouble breathing. ? · You pass maroon or bloody stools. ?Call your doctor now or seek immediate medical care if:  ? · You have pain that does not get better after your take pain medicine. ? · You have new or worse belly pain. ? · You have blood in your stools. ? · You are sick to your stomach and cannot keep fluids down. ? · You have a fever. ? · You cannot pass stools or gas. ? Watch closely for changes in your health, and be sure to contact your doctor if:  ? · Your throat still hurts after a day or two. ? · You do not get better as expected.    Where can you learn more?  Go to http://benja-alex.info/. Enter [phone] in the search box to learn more about \"Upper GI Endoscopy: What to Expect at Home. \"  Current as of: May 12, 2017  Content Version: 11.4  © 3695-6262 [employer]. Care instructions adapted under license by [employer] (which disclaims liability or warranty for this information). If you have questions about a medical condition or this instruction, always ask your healthcare professional. [place] [address] any warranty or liability for your use of this information. DISCHARGE SUMMARY from Nurse    PATIENT INSTRUCTIONS:    After general anesthesia or intravenous sedation, for 24 hours or while taking prescription Narcotics:  · Limit your activities  · Do not drive and operate hazardous machinery  · Do not make important personal or business decisions  · Do  not drink alcoholic beverages  · If you have not urinated within 8 hours after discharge, please contact your surgeon on call. Report the following to your surgeon:  · Excessive pain, swelling, redness or odor of or around the surgical area  · Temperature over 100.5  · Nausea and vomiting lasting longer than 4 hours or if unable to take medications  · Any signs of decreased circulation or nerve impairment to extremity: change in color, persistent  numbness, tingling, coldness or increase pain  · Any questions    What to do at Home:  *  Please give a list of your current medications to your Primary Care Provider. *  Please update this list whenever your medications are discontinued, doses are      changed, or new medications (including over-the-counter products) are added. *  Please carry medication information at all times in case of emergency situations.     These are general instructions for a healthy lifestyle:    No smoking/ No tobacco products/ Avoid exposure to second hand smoke  Surgeon General's Warning:  Quitting smoking now greatly reduces serious risk to your health. Obesity, smoking, and sedentary lifestyle greatly increases your risk for illness    A healthy diet, regular physical exercise & weight monitoring are important for maintaining a healthy lifestyle    You may be retaining fluid if you have a history of heart failure or if you experience any of the following symptoms:  Weight gain of 3 pounds or more overnight or 5 pounds in a week, increased swelling in our hands or feet or shortness of breath while lying flat in bed. Please call your doctor as soon as you notice any of these symptoms; do not wait until your next office visit. Recognize signs and symptoms of STROKE:    F-face looks uneven    A-arms unable to move or move unevenly    S-speech slurred or non-existent    T-time-call 911 as soon as signs and symptoms begin-DO NOT go       Back to bed or wait to see if you get better-TIME IS BRAIN. Warning Signs of HEART ATTACK     Call 911 if you have these symptoms:   Chest discomfort. Most heart attacks involve discomfort in the center of the chest that lasts more than a few minutes, or that goes away and comes back. It can feel like uncomfortable pressure, squeezing, fullness, or pain.  Discomfort in other areas of the upper body. Symptoms can include pain or discomfort in one or both arms, the back, neck, jaw, or stomach.  Shortness of breath with or without chest discomfort.  Other signs may include breaking out in a cold sweat, nausea, or lightheadedness. Don't wait more than five minutes to call 911 - MINUTES MATTER! Fast action can save your life. Calling 911 is almost always the fastest way to get lifesaving treatment. Emergency Medical Services staff can begin treatment when they arrive -- up to an hour sooner than if someone gets to the hospital by car. The discharge information has been reviewed with the patient and spouse. The patient and spouse verbalized understanding.   Discharge medications reviewed with the patient and spouse and appropriate educational materials and side effects teaching were provided.   ___________________________________________________________________________________________________________________________________

## 2018-02-12 NOTE — IP AVS SNAPSHOT
[address] [facility] Patient: Cinthia Foss MRN: [id number]:[id number] About your hospitalization You were admitted on:  February 12, 2018 You last received care in the:  [facility] PHASE 2 RECOVERY You were discharged on:  February 12, 2018 Why you were hospitalized Your primary diagnosis was:  Not on File Follow-up Information Follow up With Details Comments Contact Info Tomás Goldsmith MD   [address] [facility] [address] [phone] Meghana rBidges MD  F/U as needed [address] [facility] [address] 
[phone] Your Scheduled Appointments Monday February 12, 2018 ENDOSCOPY with Meghana Bridges MD  
[facility] ENDOSCOPY [address] [place] [address] Dr) 0 [facility] [address] Discharge Orders None A check sonido indicates which time of day the medication should be taken. My Medications CONTINUE taking these medications Instructions Each Dose to Equal  
 Morning Noon Evening Bedtime ALFALFA PO Your last dose was: Your next dose is: Take  by mouth. aspirin 81 mg tablet Your last dose was: Your next dose is: Take 81 mg by mouth daily. 81 mg CALCIUM 600 + MINERALS PO Your last dose was: Your next dose is: Take 600 mg by mouth daily. 600 mg  
    
   
   
   
  
 CO Q-10 10 mg Cap Generic drug:  coenzyme q10 Your last dose was: Your next dose is: Take  by mouth.  
     
   
   
   
  
 escitalopram oxalate 10 mg tablet Commonly known as:  Leann Pressley Your last dose was: Your next dose is: TAKE 1 TABLET BY MOUTH DAILY FISH OIL 1,000 mg Cap Generic drug:  omega-3 fatty acids-vitamin e Your last dose was: Your next dose is: Take 1 Cap by mouth daily. 1 Cap HAWTHORN BERRY PO Your last dose was: Your next dose is: Take  by mouth. LORazepam 0.5 mg tablet Commonly known as:  ATIVAN Your last dose was: Your next dose is: TAKE 1 TABLET BY MOUTH THREE TIMES DAILY  
     
   
   
   
  
 methylPREDNISolone 4 mg tablet Commonly known as:  MEDROL (ALICE) Your last dose was: Your next dose is: Take as directed. multivitamin tablet Commonly known as:  ONE A DAY Your last dose was: Your next dose is: Take 1 Tab by mouth daily. 1 Tab PLAQUENIL 200 mg tablet Generic drug:  hydroxychloroquine Your last dose was: Your next dose is: Take 200 mg by mouth two (2) times a day. 200 mg  
    
   
   
   
  
 rosuvastatin 20 mg tablet Commonly known as:  CRESTOR Your last dose was: Your next dose is: Take 20 mg by mouth daily (after dinner). 20 mg  
    
   
   
   
  
 vitamin e 1,000 unit capsule Commonly known as:  E GEMS Your last dose was: Your next dose is: Take 1,000 Units by mouth daily. 1000 Units Discharge Instructions Upper GI Endoscopy: What to Expect at [facility] Your Recovery After you have an endoscopy, you will stay at the hospital or clinic for 1 to 2 hours. This will allow the medicine to wear off. You will be able to go home after your doctor or nurse checks to make sure you are not having any problems.  
You may have to stay overnight if you had treatment during the test. You may have a sore throat for a day or two after the test. 
 This care sheet gives you a general idea about what to expect after the test. 
How can you care for yourself at home? Activity · Rest as much as you need to after you go home. · You should be able to go back to your usual activities the day after the test. 
Diet · Follow your doctor's directions for eating after the test. 
· Drink plenty of fluids (unless your doctor has told you not to). Medications · If you have a sore throat the day after the test, use an over-the-counter spray to numb your throat. Follow-up care is a key part of your treatment and safety. Be sure to make and go to all appointments, and call your doctor if you are having problems. It's also a good idea to know your test results and keep a list of the medicines you take. When should you call for help? Call 911 anytime you think you may need emergency care. For example, call if: 
? · You passed out (loses consciousness). ? · You have trouble breathing. ? · You pass maroon or bloody stools. ?Call your doctor now or seek immediate medical care if: 
? · You have pain that does not get better after your take pain medicine. ? · You have new or worse belly pain. ? · You have blood in your stools. ? · You are sick to your stomach and cannot keep fluids down. ? · You have a fever. ? · You cannot pass stools or gas. ? Watch closely for changes in your health, and be sure to contact your doctor if: 
? · Your throat still hurts after a day or two. ? · You do not get better as expected. Where can you learn more? Go to http://benja-alex.info/. Enter ([phone] in the search box to learn more about \"Upper GI Endoscopy: What to Expect at Home. \" Current as of: May 12, 2017 Content Version: 11.4 © 2800-7030 [employer]. Care instructions adapted under license by [employer] (which disclaims liability or warranty for this information).  If you have questions about a medical condition or this instruction, always ask your healthcare professional. [place] [address] any warranty or liability for your use of this information. DISCHARGE SUMMARY from Nurse PATIENT INSTRUCTIONS: 
 
After general anesthesia or intravenous sedation, for 24 hours or while taking prescription Narcotics: · Limit your activities · Do not drive and operate hazardous machinery · Do not make important personal or business decisions · Do  not drink alcoholic beverages · If you have not urinated within 8 hours after discharge, please contact your surgeon on call. Report the following to your surgeon: 
· Excessive pain, swelling, redness or odor of or around the surgical area · Temperature over 100.5 · Nausea and vomiting lasting longer than 4 hours or if unable to take medications · Any signs of decreased circulation or nerve impairment to extremity: change in color, persistent  numbness, tingling, coldness or increase pain · Any questions What to do at Home: *  Please give a list of your current medications to your Primary Care Provider. *  Please update this list whenever your medications are discontinued, doses are 
    changed, or new medications (including over-the-counter products) are added. *  Please carry medication information at all times in case of emergency situations. These are general instructions for a healthy lifestyle: No smoking/ No tobacco products/ Avoid exposure to second hand smoke Surgeon General's Warning:  Quitting smoking now greatly reduces serious risk to your health. Obesity, smoking, and sedentary lifestyle greatly increases your risk for illness A healthy diet, regular physical exercise & weight monitoring are important for maintaining a healthy lifestyle You may be retaining fluid if you have a history of heart failure or if you experience any of the following symptoms:  Weight gain of 3 pounds or more overnight or 5 pounds in a week, increased swelling in our hands or feet or shortness of breath while lying flat in bed. Please call your doctor as soon as you notice any of these symptoms; do not wait until your next office visit. Recognize signs and symptoms of STROKE: 
 
F-face looks uneven A-arms unable to move or move unevenly S-speech slurred or non-existent T-time-call 911 as soon as signs and symptoms begin-DO NOT go Back to bed or wait to see if you get better-TIME IS BRAIN. Warning Signs of HEART ATTACK Call 911 if you have these symptoms: 
? Chest discomfort. Most heart attacks involve discomfort in the center of the chest that lasts more than a few minutes, or that goes away and comes back. It can feel like uncomfortable pressure, squeezing, fullness, or pain. ? Discomfort in other areas of the upper body. Symptoms can include pain or discomfort in one or both arms, the back, neck, jaw, or stomach. ? Shortness of breath with or without chest discomfort. ? Other signs may include breaking out in a cold sweat, nausea, or lightheadedness. Don't wait more than five minutes to call [address]! Fast action can save your life. Calling 911 is almost always the fastest way to get lifesaving treatment. Emergency Medical Services staff can begin treatment when they arrive  up to an hour sooner than if someone gets to the hospital by car. The discharge information has been reviewed with the patient and spouse. The patient and spouse verbalized understanding. Discharge medications reviewed with the patient and spouse and appropriate educational materials and side effects teaching were provided. ___________________________________________________________________________________________________________________________________ ACO Transitions of Care Introducing [employer] [address] Rosemarie Moreno offers a voluntary care coordination program to provide high quality service and care to [facility] fee-for-service beneficiaries. Kalen Freeman was designed to help you enhance your health and well-being through the following services: ? Transitions of Care  support for individuals who are transitioning from one care setting to another (example: Hospital to home). ? Chronic and Complex Care Coordination  support for individuals and caregivers of those with serious or chronic illnesses or with more than one chronic (ongoing) condition and those who take a number of different medications. If you meet specific medical criteria, a [address] [place] [address] Rd may call you directly to coordinate your care with your primary care physician and your other care providers. For questions about the [facility] programs, please, contact your physicians office. For general questions or additional information about Accountable Care Organizations: 
Please visit www.medicare.gov/acos. html or call 1-800-MEDICARE (7-354.204.9064) TTY users should call 3-708.159.6954. Introducing [facility]! Dear Beni Payton: 
Thank you for requesting a Blink (air taxi) account. Our records indicate that you already have an active Blink (air taxi) account. You can access your account anytime at [email or web address] Did you know that you can access your hospital and ER discharge instructions at any time in Blink (air taxi)? You can also review all of your test results from your hospital stay or ER visit. Additional Information If you have questions, please visit the Frequently Asked Questions section of the Blink (air taxi) website at [email or web address] Remember, Blink (air taxi) is NOT to be used for urgent needs. For medical emergencies, dial 911. Now available from your iPhone and Android! Providers Seen During Your Hospitalization Provider Specialty Primary office phone Ruthy Valdez MD Gastroenterology [phone] Your Primary Care Physician (PCP) Primary Care Physician Office Phone Office Fax [address], [address] [place] [address] [address] [phone] You are allergic to the following Allergen Reactions Seafood Anaphylaxis Antihistimine Swelling Iodine Other (comments) Wheezing and choking Recent Documentation Height Weight BMI OB Status Smoking Status 1.651 m 59 kg 21.65 kg/m2 Hysterectomy Former Smoker Emergency Contacts Name Discharge Info Relation Home Work Mobile Joaquin Guzman DISCHARGE CAREGIVER [3] Spouse [3] [phone] Patient Belongings The following personal items are in your possession at time of discharge: 
  Dental Appliances: None  Visual Aid: Glasses Please provide this summary of care documentation to your next provider. Signatures-by signing, you are acknowledging that this After Visit Summary has been reviewed with you and you have received a copy. Patient Signature:  ____________________________________________________________ Date:  ____________________________________________________________  
  
[facility] Current Provider Signature:  ____________________________________________________________ Date:  ____________________________________________________________

## 2018-02-12 NOTE — PERIOP NOTES
I have reviewed discharge instructions with the patient and spouse. The patient and spouse verbalized understanding.       Patient armband removed and shredded

## 2018-02-12 NOTE — ANESTHESIA PREPROCEDURE EVALUATION
Anesthetic History   No history of anesthetic complications            Review of Systems / Medical History  Patient summary reviewed and pertinent labs reviewed    Pulmonary  Within defined limits                 Neuro/Psych   Within defined limits           Cardiovascular                       GI/Hepatic/Renal                Endo/Other        Arthritis     Other Findings   Comments: Documentation of current medication  Current medications obtained, documented and obtained? YES      Risk Factors for Postoperative nausea/vomiting:       History of postoperative nausea/vomiting? NO       Female? YES       Motion sickness? NO       Intended opioid administration for postoperative analgesia? NO      Smoking Abstinence:  Current Smoker? NO  Elective Surgery? YES  Seen preoperatively by anesthesiologist or proxy prior to day of surgery? YES  Pt abstained from smoking 24 hours prior to anesthesia?  N/A    Preventive care/screening for High Blood Pressure:  Aged 18 years and older: YES  Screened for high blood pressure: YES  Patients with high blood pressure referred to primary care provider   for BP management: YES                 Physical Exam    Airway  Mallampati: II  TM Distance: 4 - 6 cm  Neck ROM: normal range of motion   Mouth opening: Diminished (comment)     Cardiovascular    Rhythm: regular  Rate: normal         Dental    Dentition: Poor dentition     Pulmonary  Breath sounds clear to auscultation               Abdominal  GI exam deferred       Other Findings            Anesthetic Plan    ASA: 2  Anesthesia type: MAC            Anesthetic plan and risks discussed with: Patient

## 2018-02-21 ENCOUNTER — PATIENT OUTREACH (OUTPATIENT)
Dept: INTERNAL MEDICINE CLINIC | Age: 73
End: 2018-02-21

## 2018-02-21 NOTE — PROGRESS NOTES
Transitions of [address] was seen in [facility] ED on 1/21/18 for sinusitis and impetigo and discharged to home. Goal met. Episode resolved:  No ED visit or hospitalization 30 days from discharge on 1/21/18. Goals Addressed             Most Recent       Post ED     Knowledge and adherence to medication plan.    On track (2/21/2018)             Plan:  Discuss taking new medications as prescribed-done 1/23/18

## 2018-02-26 ENCOUNTER — OFFICE VISIT (OUTPATIENT)
Dept: INTERNAL MEDICINE CLINIC | Age: 73
End: 2018-02-26

## 2018-02-26 ENCOUNTER — HOSPITAL ENCOUNTER (OUTPATIENT)
Dept: LAB | Age: 73
Discharge: HOME | End: 2018-02-26
Payer: MEDICARE

## 2018-02-26 VITALS
BODY MASS INDEX: 21.66 KG/M2 | DIASTOLIC BLOOD PRESSURE: 70 MMHG | SYSTOLIC BLOOD PRESSURE: 132 MMHG | RESPIRATION RATE: 16 BRPM | HEART RATE: 74 BPM | WEIGHT: 130 LBS | OXYGEN SATURATION: 100 % | HEIGHT: 65 IN | TEMPERATURE: 97.3 F

## 2018-02-26 DIAGNOSIS — E78.00 PURE HYPERCHOLESTEROLEMIA: ICD-10-CM

## 2018-02-26 DIAGNOSIS — Z01.818 PRE-OP EXAMINATION: ICD-10-CM

## 2018-02-26 DIAGNOSIS — Z01.818 PRE-OP EXAMINATION: Primary | ICD-10-CM

## 2018-02-26 DIAGNOSIS — F41.9 ANXIETY DISORDER, UNSPECIFIED TYPE: ICD-10-CM

## 2018-02-26 DIAGNOSIS — R01.1 HEART MURMUR: ICD-10-CM

## 2018-02-26 LAB
ALBUMIN SERPL-MCNC: 4 G/DL (ref 3.4–5)
ALBUMIN/GLOB SERPL: 1.4 {RATIO} (ref 0.8–1.7)
ALP SERPL-CCNC: 65 U/L (ref 45–117)
ALT SERPL-CCNC: 20 U/L (ref 13–56)
ANION GAP SERPL CALC-SCNC: 8 MMOL/L (ref 3–18)
AST SERPL-CCNC: 23 U/L (ref 15–37)
BASOPHILS # BLD: 0 K/UL (ref 0–0.06)
BASOPHILS NFR BLD: 0 % (ref 0–2)
BILIRUB SERPL-MCNC: 0.3 MG/DL (ref 0.2–1)
BUN SERPL-MCNC: 10 MG/DL (ref 7–18)
BUN/CREAT SERPL: 14 (ref 12–20)
CALCIUM SERPL-MCNC: 9.7 MG/DL (ref 8.5–10.1)
CHLORIDE SERPL-SCNC: 105 MMOL/L (ref 100–108)
CO2 SERPL-SCNC: 28 MMOL/L (ref 21–32)
CREAT SERPL-MCNC: 0.69 MG/DL (ref 0.6–1.3)
DIFFERENTIAL METHOD BLD: NORMAL
EOSINOPHIL # BLD: 0.1 K/UL (ref 0–0.4)
EOSINOPHIL NFR BLD: 2 % (ref 0–5)
ERYTHROCYTE [DISTWIDTH] IN BLOOD BY AUTOMATED COUNT: 14.1 % (ref 11.6–14.5)
GLOBULIN SER CALC-MCNC: 2.8 G/DL (ref 2–4)
GLUCOSE SERPL-MCNC: 92 MG/DL (ref 74–99)
HCT VFR BLD AUTO: 40.4 % (ref 35–45)
HGB BLD-MCNC: 13.8 G/DL (ref 12–16)
LYMPHOCYTES # BLD: 2.2 K/UL (ref 0.9–3.6)
LYMPHOCYTES NFR BLD: 33 % (ref 21–52)
MCH RBC QN AUTO: 32.3 PG (ref 24–34)
MCHC RBC AUTO-ENTMCNC: 34.2 G/DL (ref 31–37)
MCV RBC AUTO: 94.6 FL (ref 74–97)
MONOCYTES # BLD: 0.6 K/UL (ref 0.05–1.2)
MONOCYTES NFR BLD: 8 % (ref 3–10)
NEUTS SEG # BLD: 3.8 K/UL (ref 1.8–8)
NEUTS SEG NFR BLD: 57 % (ref 40–73)
PLATELET # BLD AUTO: 231 K/UL (ref 135–420)
PMV BLD AUTO: 9.8 FL (ref 9.2–11.8)
POTASSIUM SERPL-SCNC: 4 MMOL/L (ref 3.5–5.5)
PROT SERPL-MCNC: 6.8 G/DL (ref 6.4–8.2)
RBC # BLD AUTO: 4.27 M/UL (ref 4.2–5.3)
SODIUM SERPL-SCNC: 141 MMOL/L (ref 136–145)
WBC # BLD AUTO: 6.6 K/UL (ref 4.6–13.2)

## 2018-02-26 PROCEDURE — 85025 COMPLETE CBC W/AUTO DIFF WBC: CPT | Performed by: INTERNAL MEDICINE

## 2018-02-26 PROCEDURE — 80053 COMPREHEN METABOLIC PANEL: CPT | Performed by: INTERNAL MEDICINE

## 2018-02-26 NOTE — PROGRESS NOTES
1. Have you been to the ER, urgent care clinic since your last visit? Hospitalized since your last visit? No    2. Have you seen or consulted any other health care providers outside of the [address] [place] [address] since your last visit? Include any pap smears or colon screening. Dr. Angelita Carrillo and Dr. Marcheta Lesch - [facility].  Cataract surgery

## 2018-02-26 NOTE — PATIENT INSTRUCTIONS
Health Maintenance Due   Topic Date Due   Fatimah Lips Test  1945    Colonoscopy  04/18/1963    Shingles Vaccine  02/18/2005    Annual Well Visit  04/18/2010    Pneumococcal Vaccine (2 of 2 - PPSV23) 10/25/2017    Breast Cancer Screening  01/25/2018

## 2018-02-27 NOTE — PROGRESS NOTES
The patient presents to the office today with the chief complaint of floaters and for a pre op evaluation    HPI    Beena Guzman complains of floaters in both eyes. she is now scheduled for correction by a retina specialist.  Other than her vision the patient has no complaints. The patient denies chest pain or dyspnea. The patient remains on medications for anxiety and hyperlipidemia. She is doing ok on the medications. Review of Systems   Respiratory: Negative for shortness of breath. Cardiovascular: Negative for chest pain and leg swelling. Allergies   Allergen Reactions    Seafood Anaphylaxis    Antihistimine Swelling    Iodine Other (comments)     Wheezing and choking         Current Outpatient Prescriptions   Medication Sig Dispense Refill    LORazepam (ATIVAN) 0.5 mg tablet TAKE 1 TABLET BY MOUTH THREE TIMES DAILY 90 Tab 0    rosuvastatin (CRESTOR) 20 mg tablet Take 20 mg by mouth daily (after dinner). 4    escitalopram oxalate (LEXAPRO) 10 mg tablet TAKE 1 TABLET BY MOUTH DAILY 90 Tab 0    HAWTHORN BERRY PO Take  by mouth.  vitamin e (E GEMS) 1,000 unit capsule Take 1,000 Units by mouth daily.  coenzyme q10 (CO Q-10) 10 mg cap Take  by mouth.  ALFALFA PO Take  by mouth.  hydroxychloroquine (PLAQUENIL) 200 mg tablet Take 200 mg by mouth two (2) times a day.  aspirin 81 mg tablet Take 81 mg by mouth daily.  multivitamin (ONE A DAY) tablet Take 1 Tab by mouth daily.  omega-3 fatty acids-vitamin e (FISH OIL) 1,000 mg Cap Take 1 Cap by mouth daily.  CALCIUM CARB/VIT D3/MINERALS (CALCIUM 600 + MINERALS PO) Take 600 mg by mouth daily.          Past Medical History:   Diagnosis Date    Acute upper respiratory infection     Anxiety disorder     Arthritis     Costal chondritis     Dyslipidemia     Dyspnea     Fibromyalgia     Hypotension     Interstitial lung disease (HCC)     Jaw pain     Mitral valve prolapse     pt denies this    Pernicious anemia     Primary fibromyalgia syndrome     Pure hypercholesterolemia     Quadricuspid aortic valve ?? ECHO 7/14    Rheumatoid arthritis(714.0)     Visceral leishmaniasis        Past Surgical History:   Procedure Laterality Date    HX CATARACT REMOVAL Bilateral     HX HEART CATHETERIZATION  2004    normal    HX PARTIAL HYSTERECTOMY      HX TONSILLECTOMY         Social History     Social History    Marital status:      Spouse name: N/A    Number of children: N/A    Years of education: N/A     Occupational History    Not on file. Social History Main Topics    Smoking status: Former Smoker     Packs/day: 0.25     Quit date: 4/15/1975    Smokeless tobacco: Never Used    Alcohol use 0.0 oz/week     0 Standard drinks or equivalent per week      Comment: occasional    Drug use: No    Sexual activity: Yes     Partners: Male     Other Topics Concern    Not on file     Social History Narrative       Patient does not have an advanced directive on file    Visit Vitals    /70 (BP 1 Location: Left arm, BP Patient Position: Sitting)    Pulse 74    Temp 97.3 °F (36.3 °C) (Tympanic)    Resp 16    Ht 5' 5\" (1.651 m)    Wt 130 lb (59 kg)    SpO2 100%    BMI 21.63 kg/m2       Physical Exam   No Cervical Lymphadenopathy  No Supraclavicular Lymphadenopathy  Thyroid is Normal  Lungs are normal to percussion. Clear to auscultation   Heart:  S1 S2 are normal, No gallops, No mummers  No Carotid Bruits  Abdomen:  Normal Bowel Sounds. No tenderness. No masses. No Hepatomegaly or Splenomegly  LE:  Strong Pedal Pulses. No Edema      BMI:  OK    No visits with results within 3 Month(s) from this visit.   Latest known visit with results is:    Hospital Outpatient Visit on 10/04/2017   Component Date Value Ref Range Status    Aldolase 10/04/2017 4.1  3.3 - 10.3 U/L Final    Comment: (NOTE)  Performed At: [address] [id number]  Shirin Ackerman MD TC:[id number]      Aspergillus flavus Abs 10/04/2017 NEGATIVE   NEGATIVE   Final    A.  fumigatus # 2 Ab 10/04/2017 NEGATIVE   NEGATIVE   Final    A.  fumigatus # 3 Ab 10/04/2017 NEGATIVE   NEGATIVE   Final    Saccharomonospora viridis Ab 10/04/2017 NEGATIVE   NEGATIVE   Final    Thermoactinomyces candidus Ab 10/04/2017 NEGATIVE   NEGATIVE   Final    T. saccharii Ab 10/04/2017 NEGATIVE   NEGATIVE   Final    Comment: (NOTE)  A courtesy copy of this report has been sent to  [phone]. Performed At: [address] [id number]  Jaky York MD BQ:[id number]         . No results found for any visits on 02/26/18. Pre op testing:         EKG:  normal    Assessment / Plan      ICD-10-CM ICD-9-CM    1. Pre-op examination Z01.818 V72.84 AMB POC EKG ROUTINE W/ 12 LEADS, INTER & REP      CBC WITH AUTOMATED DIFF      METABOLIC PANEL, COMPREHENSIVE   2. Heart murmur R01.1 785.2    3. Anxiety disorder, unspecified type F41.9 300.00    4. Pure hypercholesterolemia E78.00 272.0        THE PATIENT IS OK FOR SURGERY    OK to hold the medications on the AM of surgery - then resume post op    Follow-up Disposition:  Return in about 5 months (around 7/26/2018). I asked Jessi Guzman if she has any questions and I answered the questions. Beena Buck states that she understands the treatment plan and agrees with the treatment plan

## 2018-03-27 DIAGNOSIS — F41.9 ANXIETY DISORDER, UNSPECIFIED TYPE: ICD-10-CM

## 2018-03-27 RX ORDER — LORAZEPAM 0.5 MG/1
TABLET ORAL
Qty: 90 TAB | Refills: 0 | Status: SHIPPED | OUTPATIENT
Start: 2018-03-27 | End: 2018-03-28 | Stop reason: SDUPTHER

## 2018-03-28 DIAGNOSIS — F41.9 ANXIETY DISORDER, UNSPECIFIED TYPE: ICD-10-CM

## 2018-03-28 RX ORDER — LORAZEPAM 0.5 MG/1
TABLET ORAL
Qty: 90 TAB | Refills: 0 | Status: SHIPPED | OUTPATIENT
Start: 2018-03-28 | End: 2018-05-14 | Stop reason: SDUPTHER

## 2018-03-29 ENCOUNTER — TELEPHONE (OUTPATIENT)
Dept: INTERNAL MEDICINE CLINIC | Age: 73
End: 2018-03-29

## 2018-04-06 ENCOUNTER — HOSPITAL ENCOUNTER (OUTPATIENT)
Dept: LAB | Age: 73
Discharge: HOME | End: 2018-04-06
Payer: MEDICARE

## 2018-04-06 DIAGNOSIS — H30.92 LEFT POSTERIOR UVEITIS: ICD-10-CM

## 2018-04-06 LAB
CRP SERPL-MCNC: 3.3 MG/DL (ref 0–0.3)
ERYTHROCYTE [SEDIMENTATION RATE] IN BLOOD: 18 MM/HR (ref 0–30)

## 2018-04-06 PROCEDURE — 85652 RBC SED RATE AUTOMATED: CPT | Performed by: OPHTHALMOLOGY

## 2018-04-06 PROCEDURE — 86140 C-REACTIVE PROTEIN: CPT | Performed by: OPHTHALMOLOGY

## 2018-04-06 PROCEDURE — 36415 COLL VENOUS BLD VENIPUNCTURE: CPT | Performed by: OPHTHALMOLOGY

## 2018-04-18 ENCOUNTER — HOSPITAL ENCOUNTER (OUTPATIENT)
Dept: LAB | Age: 73
Discharge: HOME | End: 2018-04-18
Payer: MEDICARE

## 2018-04-18 ENCOUNTER — OFFICE VISIT (OUTPATIENT)
Dept: INTERNAL MEDICINE CLINIC | Age: 73
End: 2018-04-18

## 2018-04-18 VITALS
DIASTOLIC BLOOD PRESSURE: 82 MMHG | OXYGEN SATURATION: 99 % | RESPIRATION RATE: 16 BRPM | HEART RATE: 85 BPM | TEMPERATURE: 97.9 F | BODY MASS INDEX: 20.83 KG/M2 | HEIGHT: 65 IN | SYSTOLIC BLOOD PRESSURE: 140 MMHG | WEIGHT: 125 LBS

## 2018-04-18 DIAGNOSIS — N30.00 ACUTE CYSTITIS WITHOUT HEMATURIA: ICD-10-CM

## 2018-04-18 DIAGNOSIS — Z00.00 MEDICARE ANNUAL WELLNESS VISIT, SUBSEQUENT: ICD-10-CM

## 2018-04-18 DIAGNOSIS — N30.00 ACUTE CYSTITIS WITHOUT HEMATURIA: Primary | ICD-10-CM

## 2018-04-18 DIAGNOSIS — F41.9 ANXIETY DISORDER, UNSPECIFIED TYPE: ICD-10-CM

## 2018-04-18 DIAGNOSIS — R41.3 MEMORY LOSS: ICD-10-CM

## 2018-04-18 LAB
BILIRUB UR QL STRIP: NEGATIVE
GLUCOSE UR-MCNC: NEGATIVE MG/DL
KETONES P FAST UR STRIP-MCNC: NEGATIVE MG/DL
PH UR STRIP: 7.5 [PH] (ref 4.6–8)
PROT UR QL STRIP: NEGATIVE
SP GR UR STRIP: 1.01 (ref 1–1.03)
UA UROBILINOGEN AMB POC: NORMAL (ref 0.2–1)
URINALYSIS CLARITY POC: CLEAR
URINALYSIS COLOR POC: YELLOW
URINE BLOOD POC: NEGATIVE
URINE LEUKOCYTES POC: NORMAL
URINE NITRITES POC: NEGATIVE

## 2018-04-18 PROCEDURE — 87186 SC STD MICRODIL/AGAR DIL: CPT | Performed by: NURSE PRACTITIONER

## 2018-04-18 PROCEDURE — 87086 URINE CULTURE/COLONY COUNT: CPT | Performed by: NURSE PRACTITIONER

## 2018-04-18 PROCEDURE — 87077 CULTURE AEROBIC IDENTIFY: CPT | Performed by: NURSE PRACTITIONER

## 2018-04-18 RX ORDER — NITROFURANTOIN 25; 75 MG/1; MG/1
100 CAPSULE ORAL 2 TIMES DAILY
Qty: 14 CAP | Refills: 0 | Status: SHIPPED | OUTPATIENT
Start: 2018-04-18 | End: 2018-04-26

## 2018-04-18 RX ORDER — MELATONIN
3000 DAILY
COMMUNITY
End: 2018-06-24 | Stop reason: ALTCHOICE

## 2018-04-18 RX ORDER — NEOMYCIN SULFATE, POLYMYXIN B SULFATE, AND DEXAMETHASONE 3.5; 10000; 1 MG/G; [USP'U]/G; MG/G
OINTMENT OPHTHALMIC
Refills: 1 | COMMUNITY
Start: 2018-04-07 | End: 2018-06-24 | Stop reason: ALTCHOICE

## 2018-04-18 RX ORDER — LANOLIN ALCOHOL/MO/W.PET/CERES
2000 CREAM (GRAM) TOPICAL DAILY
COMMUNITY
End: 2018-06-24 | Stop reason: ALTCHOICE

## 2018-04-18 NOTE — MR AVS SNAPSHOT
[address] 
 
 
 [address], [address] [place] [address] 
[phone] Patient: Emelyn Schmid MRN: [id number] [facility] [place]:[id number] Visit Information Date & Time Provider Department Dept. Phone Encounter #  
 4/18/2018 11:15 AM Leslie Thomas NP [facility] OF [place] [phone] [id number] Your Appointments 4/27/2018  3:30 PM  
Follow Up with Leslie Thomas NP  
[facility] INTER[facility] MEDICINE OF [place] ([address]) Appt Note: 10 day f/u  
 [address], [address] [place] [address]  
  
   
 [address], [address] [place] [address] Upcoming Health Maintenance Date Due Hepatitis C Screening 1945 COLONOSCOPY 4/18/1963 ZOSTER VACCINE AGE 60> 2/18/2005 Pneumococcal 65+ Low/Medium Risk (2 of 2 - PPSV23) 10/25/2017 BREAST CANCER SCRN MAMMOGRAM 1/25/2018 MEDICARE YEARLY EXAM 3/14/2018 GLAUCOMA SCREENING Q2Y 2/28/2019 DTaP/Tdap/Td series (3 - Td) 10/26/2025 Allergies as of 4/18/2018  Review Complete On: 4/18/2018 By: Leslie Thomas NP Severity Noted Reaction Type Reactions Seafood High 02/12/2018    Anaphylaxis Antihistimine   Side Effect Swelling Iodine  02/12/2018    Other (comments) Wheezing and choking Current Immunizations  Reviewed on 2/23/2018 Name Date Influenza High Dose Vaccine PF 9/18/2017, 10/25/2016 Influenza Vaccine (Quad) PF 10/26/2015 Pneumococcal Conjugate (PCV-13) 10/25/2016 Tdap 10/26/2015, 10/1/2013 Not reviewed this visit You Were Diagnosed With   
  
 Codes Comments Confusion    -  Primary ICD-10-CM: R41.0 ICD-9-CM: 298.9 Acute cystitis without hematuria     ICD-10-CM: N30.00 ICD-9-CM: 595.0 Anxiety disorder, unspecified type     ICD-10-CM: F41.9 ICD-9-CM: 300.00 Vitals BP Pulse Temp Resp Height(growth percentile) 140/82 (BP 1 Location: Left arm, BP Patient Position: Sitting) 85 97.9 °F (36.6 °C) (Tympanic) 16 5' 5\" (1.651 m) Weight(growth percentile) SpO2 BMI OB Status Smoking Status 125 lb (56.7 kg) 99% 20.8 kg/m2 Hysterectomy Former Smoker BMI and BSA Data Body Mass Index Body Surface Area  
 20.8 kg/m 2 1.61 m 2 Preferred Pharmacy Pharmacy Name Phone [address], [address] [place] [address] [phone] [facility] [phone] Your Updated Medication List  
  
   
This list is accurate as of 4/18/18  1:10 PM.  Always use your most recent med list.  
  
  
  
  
 ALFALFA PO Take  by mouth. aspirin 81 mg tablet Take 81 mg by mouth daily. CALCIUM 600 + MINERALS PO Take 600 mg by mouth daily. CO Q-10 10 mg Cap Generic drug:  coenzyme q10 Take  by mouth.  
  
 cyanocobalamin 1,000 mcg tablet Take 2,000 mcg by mouth daily. escitalopram oxalate 10 mg tablet Commonly known as:  Olema Roof TAKE 1 TABLET BY MOUTH DAILY FISH OIL 1,000 mg Cap Generic drug:  omega-3 fatty acids-vitamin e Take 1 Cap by mouth daily. GINKOBA PO Take  by mouth. HAWTHORN LANDIS PO Take  by mouth. LORazepam 0.5 mg tablet Commonly known as:  ATIVAN  
TAKE 1 TABLET BY MOUTH THREE TIMES DAILY  
  
 multivitamin tablet Commonly known as:  ONE A DAY Take 1 Tab by mouth daily. neomycin-polymyxin-dexamethasone 3.5 mg/g-10,000 unit/g-0.1 % ophthalmic ointment Commonly known as:  DEXACINE  
APPLY 0.25 INCH STRIP TO LEFT EYE QID UTD  
  
 nitrofurantoin (macrocrystal-monohydrate) 100 mg capsule Commonly known as:  MACROBID Take 1 Cap by mouth two (2) times a day. PLAQUENIL 200 mg tablet Generic drug:  hydroxychloroquine Take 200 mg by mouth two (2) times a day. rosuvastatin 20 mg tablet Commonly known as:  CRESTOR Take 20 mg by mouth daily (after dinner). VITAMIN D3 1,000 unit tablet Generic drug:  cholecalciferol Take 3,000 Units by mouth daily. vitamin e 1,000 unit capsule Commonly known as:  E GEMS Take 1,000 Units by mouth daily. Prescriptions Sent to Pharmacy Refills  
 nitrofurantoin, macrocrystal-monohydrate, (MACROBID) 100 mg capsule 0 Sig: Take 1 Cap by mouth two (2) times a day. Class: Normal  
 Pharmacy: [address] [place] [address] #: [phone] Route: Oral  
  
We Performed the Following AMB POC URINALYSIS DIP STICK AUTO W/O MICRO [07430 CPT(R)] To-Do List   
 04/18/2018 Microbiology:  CULTURE, URINE Introducing [facility]! Dear Julissa Varela: 
Thank you for requesting a [facility] account. Our records indicate that you already have an active [facility] account. You can access your account anytime at [email or web address] Did you know that you can access your hospital and ER discharge instructions at any time in [facility]? You can also review all of your test results from your hospital stay or ER visit. Additional Information If you have questions, please visit the Frequently Asked Questions section of the [facility] website at [email or web address] Remember, [facility] is NOT to be used for urgent needs. For medical emergencies, dial 911. Now available from your iPhone and Android! Please provide this summary of care documentation to your next provider. Your primary care clinician is listed as Berhane Ogden. If you have any questions after today's visit, please call [phone].

## 2018-04-18 NOTE — PROGRESS NOTES
Alex Carlson is a 68 y.o. female presenting today for Memory Loss and Anxiety  . HPI:  Alex Carlson presents to the office today for memory loss. The patient and her  reports over the last several weeks she has had difficult remembering center events and she is overly anxious. Mrs. Guzman  notes he stopped her Ativan and Plaquenil as he thought those medications were associated with her memory loss. She is very anxious about everything and gets upset if she does not hear from her children daily. She is negative for any headache or other pain. Review of Systems   Respiratory: Negative for cough. Cardiovascular: Negative for chest pain and palpitations. Neurological: Negative for headaches. Psychiatric/Behavioral: Positive for memory loss. The patient is nervous/anxious. Allergies   Allergen Reactions    Seafood Anaphylaxis    Antihistimine Swelling    Iodine Other (comments)     Wheezing and choking         Current Outpatient Prescriptions   Medication Sig Dispense Refill    neomycin-polymyxin-dexamethasone (DEXACINE) 3.5 mg/g-10,000 unit/g-0.1 % ophthalmic ointment APPLY 0.25 INCH STRIP TO LEFT EYE QID UTD  1    GINKGO BILOBA (GINKOBA PO) Take  by mouth.  cholecalciferol (VITAMIN D3) 1,000 unit tablet Take 3,000 Units by mouth daily.  cyanocobalamin 1,000 mcg tablet Take 2,000 mcg by mouth daily.  nitrofurantoin, macrocrystal-monohydrate, (MACROBID) 100 mg capsule Take 1 Cap by mouth two (2) times a day. 14 Cap 0    LORazepam (ATIVAN) 0.5 mg tablet TAKE 1 TABLET BY MOUTH THREE TIMES DAILY 90 Tab 0    HAWTHORN BERRY PO Take  by mouth.  vitamin e (E GEMS) 1,000 unit capsule Take 1,000 Units by mouth daily.  coenzyme q10 (CO Q-10) 10 mg cap Take  by mouth.  ALFALFA PO Take  by mouth.  multivitamin (ONE A DAY) tablet Take 1 Tab by mouth daily.       omega-3 fatty acids-vitamin e (FISH OIL) 1,000 mg Cap Take 1 Cap by mouth daily.      CALCIUM CARB/VIT D3/MINERALS (CALCIUM 600 + MINERALS PO) Take 600 mg by mouth daily.  rosuvastatin (CRESTOR) 20 mg tablet Take 20 mg by mouth daily (after dinner). 4    escitalopram oxalate (LEXAPRO) 10 mg tablet TAKE 1 TABLET BY MOUTH DAILY 90 Tab 0    hydroxychloroquine (PLAQUENIL) 200 mg tablet Take 200 mg by mouth two (2) times a day.  aspirin 81 mg tablet Take 81 mg by mouth daily. Past Medical History:   Diagnosis Date    Acute upper respiratory infection     Anxiety disorder     Arthritis     Costal chondritis     Dyslipidemia     Dyspnea     Fibromyalgia     Hypotension     Interstitial lung disease (HCC)     Jaw pain     Mitral valve prolapse     pt denies this    Pernicious anemia     Primary fibromyalgia syndrome     Pure hypercholesterolemia     Quadricuspid aortic valve ?? ECHO 7/14    Rheumatoid arthritis(714.0)     Visceral leishmaniasis        Past Surgical History:   Procedure Laterality Date    HX CATARACT REMOVAL Bilateral     HX HEART CATHETERIZATION  2004    normal    HX PARTIAL HYSTERECTOMY      HX TONSILLECTOMY         Social History     Social History    Marital status:      Spouse name: N/A    Number of children: N/A    Years of education: N/A     Occupational History    Not on file.      Social History Main Topics    Smoking status: Former Smoker     Packs/day: 0.25     Quit date: 4/15/1975    Smokeless tobacco: Never Used    Alcohol use 0.0 oz/week     0 Standard drinks or equivalent per week      Comment: occasional    Drug use: No    Sexual activity: Yes     Partners: Male     Other Topics Concern    Not on file     Social History Narrative       Patient does not have an advanced directive on file    Vitals:    04/18/18 1151   BP: 140/82   Pulse: 85   Resp: 16   Temp: 97.9 °F (36.6 °C)   TempSrc: Tympanic   SpO2: 99%   Weight: 125 lb (56.7 kg)   Height: 5' 5\" (1.651 m)   PainSc:   0 - No pain       Physical Exam Constitutional: No distress. Cardiovascular: Normal rate and regular rhythm. Pulmonary/Chest: Effort normal and breath sounds normal.   Psychiatric: Her mood appears anxious. She is agitated. She exhibits abnormal recent memory. Vitals reviewed.       Hospital Outpatient Visit on 04/18/2018   Component Date Value Ref Range Status    Special Requests: 04/18/2018 NO SPECIAL REQUESTS    Final    Culture result: 04/18/2018 >100,000 COLONIES/mL ESCHERICHIA COLI*   Final   Office Visit on 04/18/2018   Component Date Value Ref Range Status    Color (UA POC) 04/18/2018 Yellow   Final    Clarity (UA POC) 04/18/2018 Clear   Final    Glucose (UA POC) 04/18/2018 Negative  Negative Final    Bilirubin (UA POC) 04/18/2018 Negative  Negative Final    Ketones (UA POC) 04/18/2018 Negative  Negative Final    Specific gravity (UA POC) 04/18/2018 1.015  1.001 - 1.035 Final    Blood (UA POC) 04/18/2018 Negative  Negative Final    pH (UA POC) 04/18/2018 7.5  4.6 - 8.0 Final    Protein (UA POC) 04/18/2018 Negative  Negative Final    Urobilinogen (UA POC) 04/18/2018 0.2 mg/dL  0.2 - 1 Final    Nitrites (UA POC) 04/18/2018 Negative  Negative Final    Leukocyte esterase (UA POC) 04/18/2018 3+  Negative Final   Hospital Outpatient Visit on 04/06/2018   Component Date Value Ref Range Status    Sed rate, automated 04/06/2018 18  0 - 30 mm/hr Final    C-Reactive protein 04/06/2018 3.3* 0 - 0.3 mg/dL Final   Hospital Outpatient Visit on 02/26/2018   Component Date Value Ref Range Status    WBC 02/26/2018 6.6  4.6 - 13.2 K/uL Final    RBC 02/26/2018 4.27  4.20 - 5.30 M/uL Final    HGB 02/26/2018 13.8  12.0 - 16.0 g/dL Final    HCT 02/26/2018 40.4  35.0 - 45.0 % Final    MCV 02/26/2018 94.6  74.0 - 97.0 FL Final    MCH 02/26/2018 32.3  24.0 - 34.0 PG Final    MCHC 02/26/2018 34.2  31.0 - 37.0 g/dL Final    RDW 02/26/2018 14.1  11.6 - 14.5 % Final    PLATELET 18/85/9140 551  135 - 420 K/uL Final    MPV 02/26/2018 9.8 9.2 - 11.8 FL Final    NEUTROPHILS 02/26/2018 57  40 - 73 % Final    LYMPHOCYTES 02/26/2018 33  21 - 52 % Final    MONOCYTES 02/26/2018 8  3 - 10 % Final    EOSINOPHILS 02/26/2018 2  0 - 5 % Final    BASOPHILS 02/26/2018 0  0 - 2 % Final    ABS. NEUTROPHILS 02/26/2018 3.8  1.8 - 8.0 K/UL Final    ABS. LYMPHOCYTES 02/26/2018 2.2  0.9 - 3.6 K/UL Final    ABS. MONOCYTES 02/26/2018 0.6  0.05 - 1.2 K/UL Final    ABS. EOSINOPHILS 02/26/2018 0.1  0.0 - 0.4 K/UL Final    ABS. BASOPHILS 02/26/2018 0.0  0.0 - 0.06 K/UL Final    DF 02/26/2018 AUTOMATED    Final    Sodium 02/26/2018 141  136 - 145 mmol/L Final    Potassium 02/26/2018 4.0  3.5 - 5.5 mmol/L Final    Chloride 02/26/2018 105  100 - 108 mmol/L Final    CO2 02/26/2018 28  21 - 32 mmol/L Final    Anion gap 02/26/2018 8  3.0 - 18 mmol/L Final    Glucose 02/26/2018 92  74 - 99 mg/dL Final    BUN 02/26/2018 10  7.0 - 18 MG/DL Final    Creatinine 02/26/2018 0.69  0.6 - 1.3 MG/DL Final    BUN/Creatinine ratio 02/26/2018 14  12 - 20   Final    GFR est AA 02/26/2018 >60  >60 ml/min/1.73m2 Final    GFR est non-AA 02/26/2018 >60  >60 ml/min/1.73m2 Final    Comment: (NOTE)  Estimated GFR is calculated using the Modification of Diet in Renal   Disease (MDRD) Study equation, reported for both  Americans   (GFRAA) and non- Americans (GFRNA), and normalized to 1.73m2   body surface area. The physician must decide which value applies to   the patient. The MDRD study equation should only be used in   individuals age 25 or older. It has not been validated for the   following: pregnant women, patients with serious comorbid conditions,   or on certain medications, or persons with extremes of body size,   muscle mass, or nutritional status.       Calcium 02/26/2018 9.7  8.5 - 10.1 MG/DL Final    Bilirubin, total 02/26/2018 0.3  0.2 - 1.0 MG/DL Final    ALT (SGPT) 02/26/2018 20  13 - 56 U/L Final    AST (SGOT) 02/26/2018 23  15 - 37 U/L Final    Alk. phosphatase 02/26/2018 65  45 - 117 U/L Final    Protein, total 02/26/2018 6.8  6.4 - 8.2 g/dL Final    Albumin 02/26/2018 4.0  3.4 - 5.0 g/dL Final    Globulin 02/26/2018 2.8  2.0 - 4.0 g/dL Final    A-G Ratio 02/26/2018 1.4  0.8 - 1.7   Final       .  Results for orders placed or performed during the hospital encounter of 04/18/18   CULTURE, URINE   Result Value Ref Range    Special Requests: NO SPECIAL REQUESTS      Culture result: >100,000 COLONIES/mL ESCHERICHIA COLI (A)         Susceptibility    Escherichia coli - SURI     Ampicillin ($) >=32 Resistant ug/mL     Ampicillin/sulbactam ($) >=32 Resistant ug/mL     Cefazolin ($) <=4 Susceptible ug/mL     Cefepime ($$) <=1 Susceptible ug/mL     Ceftazidime ($) <=1 Susceptible ug/mL     Ceftriaxone ($) <=1 Susceptible ug/mL     Ciprofloxacin ($) <=0.25 Susceptible ug/mL     Gentamicin ($) <=1 Susceptible ug/mL     Imipenem <=0.25 Susceptible ug/mL     Levofloxacin ($) <=0.12 Susceptible ug/mL     Nitrofurantoin <=16 Susceptible ug/mL     Piperacillin/Tazobac ($) <=4 Susceptible ug/mL     Tobramycin ($) <=1 Susceptible ug/mL     Trimeth-Sulfamethoxa <=20 Susceptible ug/mL   Results for orders placed or performed in visit on 04/18/18   AMB POC URINALYSIS DIP STICK AUTO W/O MICRO   Result Value Ref Range    Color (UA POC) Yellow     Clarity (UA POC) Clear     Glucose (UA POC) Negative Negative    Bilirubin (UA POC) Negative Negative    Ketones (UA POC) Negative Negative    Specific gravity (UA POC) 1.015 1.001 - 1.035    Blood (UA POC) Negative Negative    pH (UA POC) 7.5 4.6 - 8.0    Protein (UA POC) Negative Negative    Urobilinogen (UA POC) 0.2 mg/dL 0.2 - 1    Nitrites (UA POC) Negative Negative    Leukocyte esterase (UA POC) 3+ Negative       Assessment / Plan:      ICD-10-CM ICD-9-CM    1. Confusion R41.0 298.9 AMB POC URINALYSIS DIP STICK AUTO W/O MICRO   2.  Acute cystitis without hematuria N30.00 595.0 CULTURE, URINE      nitrofurantoin, macrocrystal-monohydrate, (MACROBID) 100 mg capsule   3. Anxiety disorder, unspecified type F41.9 300.00      Acute cystitis     Macrobid     Urine Culture  Anxiety- restart Ativan  F/u in 10 days    Follow-up Disposition:  Return in about 7 days (around 4/25/2018), or if symptoms worsen or fail to improve. I asked the patient if she  had any questions and answered her  questions. The patient stated that she understands the treatment plan and agrees with the treatment plan      This is the Subsequent Medicare Annual Wellness Exam, performed 12 months or more after the Initial AWV or the last Subsequent AWV    I have reviewed the patient's medical history in detail and updated the computerized patient record. History     Past Medical History:   Diagnosis Date    Acute upper respiratory infection     Anxiety disorder     Arthritis     Costal chondritis     Dyslipidemia     Dyspnea     Fibromyalgia     Hypotension     Interstitial lung disease (HCC)     Jaw pain     Mitral valve prolapse     pt denies this    Pernicious anemia     Primary fibromyalgia syndrome     Pure hypercholesterolemia     Quadricuspid aortic valve ?? ECHO 7/14    Rheumatoid arthritis(714.0)     Visceral leishmaniasis       Past Surgical History:   Procedure Laterality Date    HX CATARACT REMOVAL Bilateral     HX HEART CATHETERIZATION  2004    normal    HX PARTIAL HYSTERECTOMY      HX TONSILLECTOMY       Current Outpatient Prescriptions   Medication Sig Dispense Refill    neomycin-polymyxin-dexamethasone (DEXACINE) 3.5 mg/g-10,000 unit/g-0.1 % ophthalmic ointment APPLY 0.25 INCH STRIP TO LEFT EYE QID UTD  1    GINKGO BILOBA (GINKOBA PO) Take  by mouth.  cholecalciferol (VITAMIN D3) 1,000 unit tablet Take 3,000 Units by mouth daily.  cyanocobalamin 1,000 mcg tablet Take 2,000 mcg by mouth daily.       nitrofurantoin, macrocrystal-monohydrate, (MACROBID) 100 mg capsule Take 1 Cap by mouth two (2) times a day. 14 Cap 0    LORazepam (ATIVAN) 0.5 mg tablet TAKE 1 TABLET BY MOUTH THREE TIMES DAILY 90 Tab 0    HAWTHORN BERRY PO Take  by mouth.  vitamin e (E GEMS) 1,000 unit capsule Take 1,000 Units by mouth daily.  coenzyme q10 (CO Q-10) 10 mg cap Take  by mouth.  ALFALFA PO Take  by mouth.  multivitamin (ONE A DAY) tablet Take 1 Tab by mouth daily.  omega-3 fatty acids-vitamin e (FISH OIL) 1,000 mg Cap Take 1 Cap by mouth daily.  CALCIUM CARB/VIT D3/MINERALS (CALCIUM 600 + MINERALS PO) Take 600 mg by mouth daily.  rosuvastatin (CRESTOR) 20 mg tablet Take 20 mg by mouth daily (after dinner). 4    escitalopram oxalate (LEXAPRO) 10 mg tablet TAKE 1 TABLET BY MOUTH DAILY 90 Tab 0    hydroxychloroquine (PLAQUENIL) 200 mg tablet Take 200 mg by mouth two (2) times a day.  aspirin 81 mg tablet Take 81 mg by mouth daily.        Allergies   Allergen Reactions    Seafood Anaphylaxis    Antihistimine Swelling    Iodine Other (comments)     Wheezing and choking       Family History   Problem Relation Age of Onset    Heart Attack Father      40,58    Heart Disease Father     Heart Disease Brother      History of stenting    Heart Attack Brother 61     Social History   Substance Use Topics    Smoking status: Former Smoker     Packs/day: 0.25     Quit date: 4/15/1975    Smokeless tobacco: Never Used    Alcohol use 0.0 oz/week     0 Standard drinks or equivalent per week      Comment: occasional     Patient Active Problem List   Diagnosis Code    Heart murmur R01.1    Dyslipidemia E78.5    Quadricuspid aortic valve ?? I35.9    Dizziness R42    Mitral valve prolapse I34.1    Costal chondritis M94.0    Dyspnea R06.00    Pure hypercholesterolemia E78.00    Pernicious anemia D51.0    Anxiety disorder F41.9    Primary fibromyalgia syndrome M79.7    Contusion of right scapula S40.011A    Depression F32.9       Depression Risk Factor Screening:     PHQ over the last two weeks 10/25/2016   PHQ Not Done Active Diagnosis of Depression or Bipolar Disorder   Little interest or pleasure in doing things -   Feeling down, depressed or hopeless -   Total Score PHQ 2 -     Alcohol Risk Factor Screening: You do not drink alcohol or very rarely. Functional Ability and Level of Safety:   Hearing Loss  Hearing is good. Activities of Daily Living  The home contains: no safety equipment. Patient does total self care    Fall Risk  Fall Risk Assessment, last 12 mths 4/18/2018   Able to walk? Yes   Fall in past 12 months? No   Fall with injury? -   Number of falls in past 12 months -   Fall Risk Score -       Abuse Screen  Patient is not abused    Cognitive Screening   Evaluation of Cognitive Function:  Has your family/caregiver stated any concerns about your memory: yes  Normal    Patient Care Team   Patient Care Team:  Shaila Kenyon MD as PCP - General (Internal Medicine)  Julio Albrecht RN as Ambulatory Care Navigator    Assessment/Plan   Education and counseling provided:  Are appropriate based on today's review and evaluation    Diagnoses and all orders for this visit:    1. Confusion  -     AMB POC URINALYSIS DIP STICK AUTO W/O MICRO    2. Acute cystitis without hematuria  -     CULTURE, URINE; Future  -     nitrofurantoin, macrocrystal-monohydrate, (MACROBID) 100 mg capsule; Take 1 Cap by mouth two (2) times a day.     3. Anxiety disorder, unspecified type        Health Maintenance Due   Topic Date Due    Hepatitis C Screening  1945    COLONOSCOPY  04/18/1963    ZOSTER VACCINE AGE 60>  02/18/2005    Pneumococcal 65+ Low/Medium Risk (2 of 2 - PPSV23) 10/25/2017    BREAST CANCER SCRN MAMMOGRAM  01/25/2018    MEDICARE YEARLY EXAM  03/14/2018

## 2018-04-19 ENCOUNTER — TELEPHONE (OUTPATIENT)
Dept: INTERNAL MEDICINE CLINIC | Age: 73
End: 2018-04-19

## 2018-04-19 NOTE — TELEPHONE ENCOUNTER
PATIENT CALL AND WANTED TO KNOW DO SHE BRANDO UE WITH HER REGULAR MEDICATION WHILE ON THE ANTIBIOTICS [phone]

## 2018-04-20 LAB
BACTERIA SPEC CULT: ABNORMAL
SERVICE CMNT-IMP: ABNORMAL

## 2018-04-20 NOTE — TELEPHONE ENCOUNTER
Ms Myriam Garcia contacted and given instructions to continue her antibiotic due to sensitivity results and that she can continue her routine medications and to follow up in 1 week

## 2018-04-26 ENCOUNTER — OFFICE VISIT (OUTPATIENT)
Dept: INTERNAL MEDICINE CLINIC | Age: 73
End: 2018-04-26

## 2018-04-26 ENCOUNTER — HOSPITAL ENCOUNTER (OUTPATIENT)
Dept: LAB | Age: 73
Discharge: HOME | End: 2018-04-26
Payer: MEDICARE

## 2018-04-26 VITALS
SYSTOLIC BLOOD PRESSURE: 136 MMHG | WEIGHT: 125 LBS | RESPIRATION RATE: 16 BRPM | OXYGEN SATURATION: 99 % | HEART RATE: 92 BPM | DIASTOLIC BLOOD PRESSURE: 72 MMHG | BODY MASS INDEX: 20.83 KG/M2 | HEIGHT: 65 IN | TEMPERATURE: 98.2 F

## 2018-04-26 DIAGNOSIS — F41.9 ANXIETY DISORDER, UNSPECIFIED TYPE: ICD-10-CM

## 2018-04-26 DIAGNOSIS — R30.0 DYSURIA: ICD-10-CM

## 2018-04-26 DIAGNOSIS — R30.0 DYSURIA: Primary | ICD-10-CM

## 2018-04-26 PROCEDURE — 87086 URINE CULTURE/COLONY COUNT: CPT | Performed by: NURSE PRACTITIONER

## 2018-04-26 PROCEDURE — 87186 SC STD MICRODIL/AGAR DIL: CPT | Performed by: NURSE PRACTITIONER

## 2018-04-26 PROCEDURE — 87077 CULTURE AEROBIC IDENTIFY: CPT | Performed by: NURSE PRACTITIONER

## 2018-04-26 RX ORDER — BUSPIRONE HYDROCHLORIDE 7.5 MG/1
7.5 TABLET ORAL 2 TIMES DAILY
Qty: 60 TAB | Refills: 1 | Status: SHIPPED | OUTPATIENT
Start: 2018-04-26 | End: 2018-04-26 | Stop reason: SDUPTHER

## 2018-04-26 RX ORDER — PHENAZOPYRIDINE HYDROCHLORIDE 100 MG/1
100 TABLET, FILM COATED ORAL
Qty: 9 TAB | Refills: 0 | Status: SHIPPED | OUTPATIENT
Start: 2018-04-26 | End: 2018-04-29

## 2018-04-26 NOTE — PROGRESS NOTES
Barbara Dumas is a 68 y.o. female presenting today for Urinary Burning  . HPI:  Barbara Dumas presents to the office today for urinary burning sensation. Patient was in the office 1 week ago and was treated for a UTI . Patient is complaining of increased burning sensation with voiding. Patient is negative for any frequency or abdominal pain. The patient was also seen last for memory loss. Patient memory is continuing to improve slowly. The patient  reports the patient does fine in the morning with her memory but she continues to be very anxious and her memory decreases when the sun goes down. Review of Systems   Constitutional: Negative for fever. Respiratory: Negative for cough. Cardiovascular: Negative for chest pain and palpitations. Genitourinary: Positive for dysuria. Negative for frequency and urgency. Psychiatric/Behavioral: The patient is nervous/anxious. Allergies   Allergen Reactions    Seafood Anaphylaxis    Antihistimine Swelling    Iodine Other (comments)     Wheezing and choking         Current Outpatient Prescriptions   Medication Sig Dispense Refill    busPIRone (BUSPAR) 7.5 mg tablet Take 1 Tab by mouth two (2) times a day. 60 Tab 1    GINKGO BILOBA (GINKOBA PO) Take  by mouth.  cholecalciferol (VITAMIN D3) 1,000 unit tablet Take 3,000 Units by mouth daily.  cyanocobalamin 1,000 mcg tablet Take 2,000 mcg by mouth daily.  LORazepam (ATIVAN) 0.5 mg tablet TAKE 1 TABLET BY MOUTH THREE TIMES DAILY 90 Tab 0    HAWTHORN BERRY PO Take  by mouth.  vitamin e (E GEMS) 1,000 unit capsule Take 1,000 Units by mouth daily.  coenzyme q10 (CO Q-10) 10 mg cap Take  by mouth.  ALFALFA PO Take  by mouth.  hydroxychloroquine (PLAQUENIL) 200 mg tablet Take 200 mg by mouth two (2) times a day.  aspirin 81 mg tablet Take 81 mg by mouth daily.  multivitamin (ONE A DAY) tablet Take 1 Tab by mouth daily.       omega-3 fatty acids-vitamin e (FISH OIL) 1,000 mg Cap Take 1 Cap by mouth daily.  CALCIUM CARB/VIT D3/MINERALS (CALCIUM 600 + MINERALS PO) Take 600 mg by mouth daily.  neomycin-polymyxin-dexamethasone (DEXACINE) 3.5 mg/g-10,000 unit/g-0.1 % ophthalmic ointment APPLY 0.25 INCH STRIP TO LEFT EYE QID UTD  1    rosuvastatin (CRESTOR) 20 mg tablet Take 20 mg by mouth daily (after dinner). 4       Past Medical History:   Diagnosis Date    Acute upper respiratory infection     Anxiety disorder     Arthritis     Costal chondritis     Dyslipidemia     Dyspnea     Fibromyalgia     Hypotension     Interstitial lung disease (HCC)     Jaw pain     Mitral valve prolapse     pt denies this    Pernicious anemia     Primary fibromyalgia syndrome     Pure hypercholesterolemia     Quadricuspid aortic valve ?? ECHO 7/14    Rheumatoid arthritis(714.0)     Visceral leishmaniasis        Past Surgical History:   Procedure Laterality Date    HX CATARACT REMOVAL Bilateral     HX HEART CATHETERIZATION  2004    normal    HX PARTIAL HYSTERECTOMY      HX TONSILLECTOMY         Social History     Social History    Marital status:      Spouse name: N/A    Number of children: N/A    Years of education: N/A     Occupational History    Not on file.      Social History Main Topics    Smoking status: Former Smoker     Packs/day: 0.25     Quit date: 4/15/1975    Smokeless tobacco: Never Used    Alcohol use 0.0 oz/week     0 Standard drinks or equivalent per week      Comment: occasional    Drug use: No    Sexual activity: Yes     Partners: Male     Other Topics Concern    Not on file     Social History Narrative       Patient does not have an advanced directive on file    Vitals:    04/26/18 1501   BP: 136/72   Pulse: 92   Resp: 16   Temp: 98.2 °F (36.8 °C)   TempSrc: Tympanic   SpO2: 99%   Weight: 125 lb (56.7 kg)   Height: 5' 5\" (1.651 m)   PainSc:   9   PainLoc: Pelvic       Physical Exam   Constitutional: No distress. Cardiovascular: Normal rate, regular rhythm and normal heart sounds. Pulmonary/Chest: Effort normal and breath sounds normal.   Abdominal: Soft. Bowel sounds are normal. There is no tenderness. Neurological: Gait normal.   Psychiatric: Her mood appears anxious (mild). Nursing note and vitals reviewed.       Hospital Outpatient Visit on 04/26/2018   Component Date Value Ref Range Status    Special Requests: 04/26/2018 NO SPECIAL REQUESTS    Final    Culture result: 04/26/2018 >100,000 COLONIES/mL ESCHERICHIA COLI*   Final   Hospital Outpatient Visit on 04/18/2018   Component Date Value Ref Range Status    Special Requests: 04/18/2018 NO SPECIAL REQUESTS    Final    Culture result: 04/18/2018 >100,000 COLONIES/mL ESCHERICHIA COLI*   Final   Office Visit on 04/18/2018   Component Date Value Ref Range Status    Color (UA POC) 04/18/2018 Yellow   Final    Clarity (UA POC) 04/18/2018 Clear   Final    Glucose (UA POC) 04/18/2018 Negative  Negative Final    Bilirubin (UA POC) 04/18/2018 Negative  Negative Final    Ketones (UA POC) 04/18/2018 Negative  Negative Final    Specific gravity (UA POC) 04/18/2018 1.015  1.001 - 1.035 Final    Blood (UA POC) 04/18/2018 Negative  Negative Final    pH (UA POC) 04/18/2018 7.5  4.6 - 8.0 Final    Protein (UA POC) 04/18/2018 Negative  Negative Final    Urobilinogen (UA POC) 04/18/2018 0.2 mg/dL  0.2 - 1 Final    Nitrites (UA POC) 04/18/2018 Negative  Negative Final    Leukocyte esterase (UA POC) 04/18/2018 3+  Negative Final   Hospital Outpatient Visit on 04/06/2018   Component Date Value Ref Range Status    Sed rate, automated 04/06/2018 18  0 - 30 mm/hr Final    C-Reactive protein 04/06/2018 3.3* 0 - 0.3 mg/dL Final   Hospital Outpatient Visit on 02/26/2018   Component Date Value Ref Range Status    WBC 02/26/2018 6.6  4.6 - 13.2 K/uL Final    RBC 02/26/2018 4.27  4.20 - 5.30 M/uL Final    HGB 02/26/2018 13.8  12.0 - 16.0 g/dL Final    HCT 02/26/2018 40.4  35.0 - 45.0 % Final    MCV 02/26/2018 94.6  74.0 - 97.0 FL Final    MCH 02/26/2018 32.3  24.0 - 34.0 PG Final    MCHC 02/26/2018 34.2  31.0 - 37.0 g/dL Final    RDW 02/26/2018 14.1  11.6 - 14.5 % Final    PLATELET 31/34/6131 549  135 - 420 K/uL Final    MPV 02/26/2018 9.8  9.2 - 11.8 FL Final    NEUTROPHILS 02/26/2018 57  40 - 73 % Final    LYMPHOCYTES 02/26/2018 33  21 - 52 % Final    MONOCYTES 02/26/2018 8  3 - 10 % Final    EOSINOPHILS 02/26/2018 2  0 - 5 % Final    BASOPHILS 02/26/2018 0  0 - 2 % Final    ABS. NEUTROPHILS 02/26/2018 3.8  1.8 - 8.0 K/UL Final    ABS. LYMPHOCYTES 02/26/2018 2.2  0.9 - 3.6 K/UL Final    ABS. MONOCYTES 02/26/2018 0.6  0.05 - 1.2 K/UL Final    ABS. EOSINOPHILS 02/26/2018 0.1  0.0 - 0.4 K/UL Final    ABS. BASOPHILS 02/26/2018 0.0  0.0 - 0.06 K/UL Final    DF 02/26/2018 AUTOMATED    Final    Sodium 02/26/2018 141  136 - 145 mmol/L Final    Potassium 02/26/2018 4.0  3.5 - 5.5 mmol/L Final    Chloride 02/26/2018 105  100 - 108 mmol/L Final    CO2 02/26/2018 28  21 - 32 mmol/L Final    Anion gap 02/26/2018 8  3.0 - 18 mmol/L Final    Glucose 02/26/2018 92  74 - 99 mg/dL Final    BUN 02/26/2018 10  7.0 - 18 MG/DL Final    Creatinine 02/26/2018 0.69  0.6 - 1.3 MG/DL Final    BUN/Creatinine ratio 02/26/2018 14  12 - 20   Final    GFR est AA 02/26/2018 >60  >60 ml/min/1.73m2 Final    GFR est non-AA 02/26/2018 >60  >60 ml/min/1.73m2 Final    Comment: (NOTE)  Estimated GFR is calculated using the Modification of Diet in Renal   Disease (MDRD) Study equation, reported for both  Americans   (GFRAA) and non- Americans (GFRNA), and normalized to 1.73m2   body surface area. The physician must decide which value applies to   the patient. The MDRD study equation should only be used in   individuals age 25 or older.  It has not been validated for the   following: pregnant women, patients with serious comorbid conditions,   or on certain medications, or persons with extremes of body size,   muscle mass, or nutritional status.  Calcium 02/26/2018 9.7  8.5 - 10.1 MG/DL Final    Bilirubin, total 02/26/2018 0.3  0.2 - 1.0 MG/DL Final    ALT (SGPT) 02/26/2018 20  13 - 56 U/L Final    AST (SGOT) 02/26/2018 23  15 - 37 U/L Final    Alk. phosphatase 02/26/2018 65  45 - 117 U/L Final    Protein, total 02/26/2018 6.8  6.4 - 8.2 g/dL Final    Albumin 02/26/2018 4.0  3.4 - 5.0 g/dL Final    Globulin 02/26/2018 2.8  2.0 - 4.0 g/dL Final    A-G Ratio 02/26/2018 1.4  0.8 - 1.7   Final       .  Results for orders placed or performed during the hospital encounter of 04/26/18   CULTURE, URINE   Result Value Ref Range    Special Requests: NO SPECIAL REQUESTS      Culture result: >100,000 COLONIES/mL ESCHERICHIA COLI (A)         Susceptibility    Escherichia coli - SURI     Ampicillin ($) >=32 Resistant ug/mL     Ampicillin/sulbactam ($) >=32 Resistant ug/mL     Cefazolin ($) <=4 Susceptible ug/mL     Cefepime ($$) <=1 Susceptible ug/mL     Ceftazidime ($) <=1 Susceptible ug/mL     Ceftriaxone ($) <=1 Susceptible ug/mL     Ciprofloxacin ($) <=0.25 Susceptible ug/mL     Gentamicin ($) <=1 Susceptible ug/mL     Imipenem <=0.25 Susceptible ug/mL     Levofloxacin ($) <=0.12 Susceptible ug/mL     Nitrofurantoin <=16 Susceptible ug/mL     Piperacillin/Tazobac ($) <=4 Susceptible ug/mL     Tobramycin ($) <=1 Susceptible ug/mL     Trimeth-Sulfamethoxa <=20 Susceptible ug/mL       Assessment / Plan:      ICD-10-CM ICD-9-CM    1. Dysuria R30.0 788.1 AMB POC URINALYSIS DIP STICK AUTO W/O MICRO      CULTURE, URINE      phenazopyridine (PYRIDIUM) 100 mg tablet   2. Anxiety disorder, unspecified type F41.9 300.00 busPIRone (BUSPAR) 7.5 mg tablet     POC Urine- trace of Leuks  Urine culture sent  Buspar rx given for anxiety  F/u in 3 weeks for anxiety follow-up    Follow-up Disposition:  Return if symptoms worsen or fail to improve.     I asked the patient if she  had any questions and answered her  questions.   The patient stated that she understands the treatment plan and agrees with the treatment plan

## 2018-04-26 NOTE — MR AVS SNAPSHOT
[address] 
 
 
 [address] [facility], [address] [facility] [address] 
[phone] Patient: Opal Razo MRN: [id number] Visit Information Date & Time Provider Department Dept. Phone Encounter #  
 4/26/2018  2:45 PM Daniela Marsh NP [facility] INTERNAL MEDICINE OF [address] [id number] Your Appointments 5/17/2018  3:00 PM  
Follow Up with Daniela Marsh NP  
[facility] INTERNAL MEDICINE OF [place] ([address]) Appt Note: 3 wk f/u  
 [address] [facility], [address] [facility] [address]  
  
   
 [address] [facility], [address] [facility] [address] Upcoming Health Maintenance Date Due Hepatitis C Screening 1945 COLONOSCOPY 4/18/1963 ZOSTER VACCINE AGE 60> 2/18/2005 Pneumococcal 65+ Low/Medium Risk (2 of 2 - PPSV23) 10/25/2017 BREAST CANCER SCRN MAMMOGRAM 1/25/2018 GLAUCOMA SCREENING Q2Y 2/28/2019 MEDICARE YEARLY EXAM 4/19/2019 DTaP/Tdap/Td series (3 - Td) 10/26/2025 Allergies as of 4/26/2018  Review Complete On: 4/26/2018 By: Daniela Marsh NP Severity Noted Reaction Type Reactions Seafood High 02/12/2018    Anaphylaxis Antihistimine   Side Effect Swelling Iodine  02/12/2018    Other (comments) Wheezing and choking Current Immunizations  Reviewed on 2/23/2018 Name Date Influenza High Dose Vaccine PF 9/18/2017, 10/25/2016 Influenza Vaccine (Quad) PF 10/26/2015 Pneumococcal Conjugate (PCV-13) 10/25/2016 Tdap 10/26/2015, 10/1/2013 Not reviewed this visit You Were Diagnosed With   
  
 Codes Comments Dysuria    -  Primary ICD-10-CM: R30.0 ICD-9-CM: 938. 1 Anxiety disorder, unspecified type     ICD-10-CM: F41.9 ICD-9-CM: 300.00 Vitals BP Pulse Temp Resp Height(growth percentile)  136/72 (BP 1 Location: Left arm, BP Patient Position: Sitting) 92 98.2 °F (36.8 °C) (Tympanic) 16 5' 5\" (1.651 m) Weight(growth percentile) SpO2 BMI OB Status Smoking Status 125 lb (56.7 kg) 99% 20.8 kg/m2 Hysterectomy Former Smoker BMI and BSA Data Body Mass Index Body Surface Area  
 20.8 kg/m 2 1.61 m 2 Preferred Pharmacy Pharmacy Name Phone [address], [address] [place] [address] [facility] [phone] Your Updated Medication List  
  
   
This list is accurate as of 4/26/18  3:26 PM.  Always use your most recent med list.  
  
  
  
  
 ALFALFA PO Take  by mouth. aspirin 81 mg tablet Take 81 mg by mouth daily. busPIRone 7.5 mg tablet Commonly known as:  BUSPAR Take 1 Tab by mouth two (2) times a day. CALCIUM 600 + MINERALS PO Take 600 mg by mouth daily. CO Q-10 10 mg Cap Generic drug:  coenzyme q10 Take  by mouth.  
  
 cyanocobalamin 1,000 mcg tablet Take 2,000 mcg by mouth daily. FISH OIL 1,000 mg Cap Generic drug:  omega-3 fatty acids-vitamin e Take 1 Cap by mouth daily. GINKOBA PO Take  by mouth. HAWTHORN LANDIS PO Take  by mouth. LORazepam 0.5 mg tablet Commonly known as:  ATIVAN  
TAKE 1 TABLET BY MOUTH THREE TIMES DAILY  
  
 multivitamin tablet Commonly known as:  ONE A DAY Take 1 Tab by mouth daily. neomycin-polymyxin-dexamethasone 3.5 mg/g-10,000 unit/g-0.1 % ophthalmic ointment Commonly known as:  DEXACINE  
APPLY 0.25 INCH STRIP TO LEFT EYE QID UTD phenazopyridine 100 mg tablet Commonly known as:  PYRIDIUM Take 1 Tab by mouth three (3) times daily (after meals) for 3 days. PLAQUENIL 200 mg tablet Generic drug:  hydroxychloroquine Take 200 mg by mouth two (2) times a day. rosuvastatin 20 mg tablet Commonly known as:  CRESTOR Take 20 mg by mouth daily (after dinner). VITAMIN D3 1,000 unit tablet Generic drug:  cholecalciferol Take 3,000 Units by mouth daily. vitamin e 1,000 unit capsule Commonly known as:  E GEMS Take 1,000 Units by mouth daily. Prescriptions Sent to Pharmacy Refills  
 busPIRone (BUSPAR) 7.5 mg tablet 1 Sig: Take 1 Tab by mouth two (2) times a day. Class: Normal  
 Pharmacy: [address] [place] [address] Ph #: [phone] Route: Oral  
 phenazopyridine (PYRIDIUM) 100 mg tablet 0 Sig: Take 1 Tab by mouth three (3) times daily (after meals) for 3 days. Class: Normal  
 Pharmacy: [address] [place] [address] Ph #: [phone] Route: Oral  
  
We Performed the Following AMB POC URINALYSIS DIP STICK AUTO W/O MICRO [42042 CPT(R)] To-Do List   
 04/26/2018 Microbiology:  CULTURE, URINE Introducing [facility] SERVICES! Dear Julissa Varela: 
Thank you for requesting a [employer] account. Our records indicate that you already have an active [employer] account. You can access your account anytime at [email or web address] Did you know that you can access your hospital and ER discharge instructions at any time in [employer]? You can also review all of your test results from your hospital stay or ER visit. Additional Information If you have questions, please visit the Frequently Asked Questions section of the [employer] website at [email or web address] Remember, [employer] is NOT to be used for urgent needs. For medical emergencies, dial 911. Now available from your iPhone and Android! Please provide this summary of care documentation to your next provider. Your primary care clinician is listed as Berhane Ogden. If you have any questions after today's visit, please call [phone].

## 2018-04-28 LAB
BACTERIA SPEC CULT: ABNORMAL
SERVICE CMNT-IMP: ABNORMAL

## 2018-05-04 RX ORDER — BUSPIRONE HYDROCHLORIDE 7.5 MG/1
TABLET ORAL
Qty: 180 TAB | Refills: 1 | Status: SHIPPED | OUTPATIENT
Start: 2018-05-04 | End: 2018-05-30 | Stop reason: ALTCHOICE

## 2018-05-14 DIAGNOSIS — F41.9 ANXIETY DISORDER, UNSPECIFIED TYPE: ICD-10-CM

## 2018-05-14 RX ORDER — LORAZEPAM 0.5 MG/1
TABLET ORAL
Qty: 90 TAB | Refills: 0 | Status: SHIPPED | OUTPATIENT
Start: 2018-05-14 | End: 2018-06-11 | Stop reason: SDUPTHER

## 2018-05-17 ENCOUNTER — OFFICE VISIT (OUTPATIENT)
Dept: INTERNAL MEDICINE CLINIC | Age: 73
End: 2018-05-17

## 2018-05-17 VITALS
WEIGHT: 120 LBS | BODY MASS INDEX: 19.99 KG/M2 | OXYGEN SATURATION: 98 % | TEMPERATURE: 98.1 F | DIASTOLIC BLOOD PRESSURE: 80 MMHG | HEIGHT: 65 IN | SYSTOLIC BLOOD PRESSURE: 120 MMHG | HEART RATE: 96 BPM | RESPIRATION RATE: 16 BRPM

## 2018-05-17 DIAGNOSIS — F41.9 ANXIETY DISORDER, UNSPECIFIED TYPE: ICD-10-CM

## 2018-05-17 DIAGNOSIS — F32.A DEPRESSION, UNSPECIFIED DEPRESSION TYPE: ICD-10-CM

## 2018-05-17 DIAGNOSIS — R41.3 MEMORY LOSS: Primary | ICD-10-CM

## 2018-05-17 DIAGNOSIS — Z87.440 H/O URINARY TRACT INFECTION: ICD-10-CM

## 2018-05-17 NOTE — MR AVS SNAPSHOT
[address], [address] [facility] [address] 
[phone] Patient: Adrien Tom MRN: [id number] NYDIA: Visit Information Date & Time Provider Department Dept. Phone Encounter #  
 2018  3:00 PM Zach Mo NP [facility] INTERNAL MEDICINE OF [address] [id number] Upcoming Health Maintenance Date Due Hepatitis C Screening 1945 COLONOSCOPY 1963 ZOSTER VACCINE AGE 60> 2005 Pneumococcal 65+ Low/Medium Risk (2 of 2 - PPSV23) 10/25/2017 BREAST CANCER SCRN MAMMOGRAM 2018 Influenza Age 5 to Adult 2018 GLAUCOMA SCREENING Q2Y 2019 MEDICARE YEARLY EXAM 2019 DTaP/Tdap/Td series (3 - Td) 10/26/2025 Allergies as of 2018  Review Complete On: 2018 By: Zach Mo NP Severity Noted Reaction Type Reactions Seafood High 2018    Anaphylaxis Antihistimine   Side Effect Swelling Iodine  2018    Other (comments) Wheezing and choking Current Immunizations  Reviewed on 2018 Name Date Influenza High Dose Vaccine PF 2017, 10/25/2016 Influenza Vaccine 2012 12:00 AM  
 Influenza Vaccine (Quad) PF 10/26/2015 Pneumococcal Conjugate (PCV-13) 10/25/2016 Tdap 10/26/2015, 10/1/2013 Not reviewed this visit Vitals Resp Height(growth percentile) Weight(growth percentile) BMI OB Status Smoking Status 16 5' 5\" (1.651 m) 120 lb (54.4 kg) 19.97 kg/m2 Hysterectomy Former Smoker BMI and BSA Data Body Mass Index Body Surface Area  
 19.97 kg/m 2 1.58 m 2 Preferred Pharmacy Pharmacy Name Phone [address] [facility] [phone] Your Updated Medication List  
  
   
This list is accurate as of 18  3:23 PM.  Always use your most recent med list.  
  
  
  
  
 ALFALFA PO  
 Take  by mouth. aspirin 81 mg tablet Take 81 mg by mouth daily. busPIRone 7.5 mg tablet Commonly known as:  BUSPAR  
TAKE 1 TABLET BY MOUTH TWICE DAILY CALCIUM 600 + MINERALS PO Take 600 mg by mouth daily. CO Q-10 10 mg Cap Generic drug:  coenzyme q10 Take  by mouth.  
  
 cyanocobalamin 1,000 mcg tablet Take 2,000 mcg by mouth daily. FISH OIL 1,000 mg Cap Generic drug:  omega-3 fatty acids-vitamin e Take 1 Cap by mouth daily. GINKOBA PO Take  by mouth. HAWTHORN LANDIS PO Take  by mouth. LORazepam 0.5 mg tablet Commonly known as:  ATIVAN  
TAKE 1 TABLET BY MOUTH THREE TIMES DAILY  
  
 multivitamin tablet Commonly known as:  ONE A DAY Take 1 Tab by mouth daily. neomycin-polymyxin-dexamethasone 3.5 mg/g-10,000 unit/g-0.1 % ophthalmic ointment Commonly known as:  DEXACINE  
APPLY 0.25 INCH STRIP TO LEFT EYE QID UTD PLAQUENIL 200 mg tablet Generic drug:  hydroxychloroquine Take 200 mg by mouth two (2) times a day. rosuvastatin 20 mg tablet Commonly known as:  CRESTOR Take 20 mg by mouth daily (after dinner). VITAMIN D3 1,000 unit tablet Generic drug:  cholecalciferol Take 3,000 Units by mouth daily. vitamin e 1,000 unit capsule Commonly known as:  E GEMS Take 1,000 Units by mouth daily. Introducing [facility] & HEALTH SERVICES! Dear Sandra Luther: 
Thank you for requesting a [employer] account. Our records indicate that you already have an active [employer] Human Services account. You can access your account anytime at [email or web address] Did you know that you can access your hospital and ER discharge instructions at any time in [employer]? You can also review all of your test results from your hospital stay or ER visit. Additional Information If you have questions, please visit the Frequently Asked Questions section of the Department of Health and Human Services website at [email or web address] Remember, [employer] is NOT to be used for urgent needs. For medical emergencies, dial 911. Now available from your iPhone and Android! Please provide this summary of care documentation to your next provider. Your primary care clinician is listed as Liza Moise. If you have any questions after today's visit, please call [phone].

## 2018-05-24 ENCOUNTER — HOSPITAL ENCOUNTER (OUTPATIENT)
Age: 73
Discharge: HOME | End: 2018-05-24
Attending: NURSE PRACTITIONER
Payer: MEDICARE

## 2018-05-24 DIAGNOSIS — R41.3 MEMORY LOSS: ICD-10-CM

## 2018-05-24 LAB — CREAT UR-MCNC: 0.9 MG/DL (ref 0.6–1.3)

## 2018-05-24 PROCEDURE — 74011636320 HC RX REV CODE- 636/320: Performed by: NURSE PRACTITIONER

## 2018-05-24 PROCEDURE — 82565 ASSAY OF CREATININE: CPT

## 2018-05-24 PROCEDURE — A9575 INJ GADOTERATE MEGLUMI 0.1ML: HCPCS | Performed by: NURSE PRACTITIONER

## 2018-05-24 PROCEDURE — 70553 MRI BRAIN STEM W/O & W/DYE: CPT

## 2018-05-24 RX ADMIN — GADOTERATE MEGLUMINE 12 ML: 376.9 INJECTION INTRAVENOUS at 15:00

## 2018-05-29 ENCOUNTER — TELEPHONE (OUTPATIENT)
Dept: INTERNAL MEDICINE CLINIC | Age: 73
End: 2018-05-29

## 2018-05-30 ENCOUNTER — HOSPITAL ENCOUNTER (OUTPATIENT)
Dept: LAB | Age: 73
Discharge: HOME | End: 2018-05-30
Payer: MEDICARE

## 2018-05-30 ENCOUNTER — OFFICE VISIT (OUTPATIENT)
Dept: INTERNAL MEDICINE CLINIC | Age: 73
End: 2018-05-30

## 2018-05-30 VITALS
OXYGEN SATURATION: 98 % | HEART RATE: 95 BPM | DIASTOLIC BLOOD PRESSURE: 88 MMHG | TEMPERATURE: 98.6 F | RESPIRATION RATE: 20 BRPM | SYSTOLIC BLOOD PRESSURE: 148 MMHG | HEIGHT: 65 IN

## 2018-05-30 DIAGNOSIS — R30.0 DYSURIA: ICD-10-CM

## 2018-05-30 DIAGNOSIS — G93.40 ENCEPHALOPATHY: ICD-10-CM

## 2018-05-30 DIAGNOSIS — G93.40 ENCEPHALOPATHY: Primary | ICD-10-CM

## 2018-05-30 DIAGNOSIS — F32.A DEPRESSION, UNSPECIFIED DEPRESSION TYPE: ICD-10-CM

## 2018-05-30 LAB — TSH SERPL DL<=0.05 MIU/L-ACNC: 2.27 UIU/ML (ref 0.36–3.74)

## 2018-05-30 PROCEDURE — 36415 COLL VENOUS BLD VENIPUNCTURE: CPT | Performed by: INTERNAL MEDICINE

## 2018-05-30 PROCEDURE — 84443 ASSAY THYROID STIM HORMONE: CPT | Performed by: INTERNAL MEDICINE

## 2018-05-30 PROCEDURE — 83921 ORGANIC ACID SINGLE QUANT: CPT | Performed by: INTERNAL MEDICINE

## 2018-05-30 RX ORDER — GALANTAMINE 4 MG/1
TABLET, FILM COATED ORAL
Qty: 60 TAB | Refills: 1 | Status: SHIPPED | OUTPATIENT
Start: 2018-05-30 | End: 2018-07-19

## 2018-05-30 RX ORDER — QUETIAPINE FUMARATE 25 MG/1
TABLET, FILM COATED ORAL
Qty: 90 TAB | Refills: 2 | Status: SHIPPED | OUTPATIENT
Start: 2018-05-30 | End: 2018-06-22 | Stop reason: ALTCHOICE

## 2018-05-30 RX ORDER — PREDNISONE 10 MG/1
TABLET ORAL
Qty: 30 TAB | Refills: 1 | Status: SHIPPED | OUTPATIENT
Start: 2018-05-30 | End: 2018-07-06 | Stop reason: ALTCHOICE

## 2018-05-30 RX ORDER — QUETIAPINE FUMARATE 25 MG/1
TABLET, FILM COATED ORAL
Qty: 30 TAB | Refills: 2 | Status: SHIPPED | OUTPATIENT
Start: 2018-05-30 | End: 2018-05-30 | Stop reason: SDUPTHER

## 2018-05-30 RX ORDER — ESTRADIOL 0.5 MG/1
0.5 TABLET ORAL DAILY
Qty: 30 TAB | Refills: 2 | Status: SHIPPED | OUTPATIENT
Start: 2018-05-30 | End: 2018-05-30 | Stop reason: SDUPTHER

## 2018-05-30 RX ORDER — SERTRALINE HYDROCHLORIDE 25 MG/1
TABLET, FILM COATED ORAL
Qty: 30 TAB | Refills: 2 | Status: SHIPPED | OUTPATIENT
Start: 2018-05-30 | End: 2018-06-22 | Stop reason: ALTCHOICE

## 2018-05-30 RX ORDER — ESTRADIOL 0.5 MG/1
TABLET ORAL
Qty: 90 TAB | Refills: 2 | Status: SHIPPED | OUTPATIENT
Start: 2018-05-30 | End: 2018-08-30 | Stop reason: ALTCHOICE

## 2018-05-30 NOTE — PROGRESS NOTES
1. Have you been to the ER, urgent care clinic since your last visit? Hospitalized since your last visit? No    2. Have you seen or consulted any other health care providers outside of the [address] [place] [address] since your last visit? Include any pap smears or colon screening.  No

## 2018-05-30 NOTE — PROGRESS NOTES
The patient presents to the office today with the chief complaint of acute encephalopathy    HPI    The patient has episodes of confusion over the past 4 months. Over the past month the episodes have become more frequent and now the patient remains confused and quite anxious. The patient complains of severe myalgias. The patient complains continued dysuria and vaginal irritation    Review of Systems   Respiratory: Negative for shortness of breath. Cardiovascular: Negative for chest pain and leg swelling. Musculoskeletal: Positive for myalgias. Psychiatric/Behavioral: The patient is nervous/anxious. Allergies   Allergen Reactions    Seafood Anaphylaxis    Antihistimine Swelling    Iodine Other (comments)     Wheezing and choking         Current Outpatient Prescriptions   Medication Sig Dispense Refill    galantamine (RAZADYNE) 4 mg tablet 1 tab twice per day 60 Tab 1    sertraline (ZOLOFT) 25 mg tablet 1 tablet daily 30 Tab 2    predniSONE (DELTASONE) 10 mg tablet 1 tab daily 30 Tab 1    estradiol (ESTRACE) 0.5 mg tablet Take 1 Tab by mouth daily. 30 Tab 2    QUEtiapine (SEROQUEL) 25 mg tablet 1 tab each evening 30 Tab 2    LORazepam (ATIVAN) 0.5 mg tablet TAKE 1 TABLET BY MOUTH THREE TIMES DAILY 90 Tab 0    cyanocobalamin 1,000 mcg tablet Take 2,000 mcg by mouth daily.  hydroxychloroquine (PLAQUENIL) 200 mg tablet Take 200 mg by mouth two (2) times a day.  multivitamin (ONE A DAY) tablet Take 1 Tab by mouth daily.  CALCIUM CARB/VIT D3/MINERALS (CALCIUM 600 + MINERALS PO) Take 600 mg by mouth daily.  neomycin-polymyxin-dexamethasone (DEXACINE) 3.5 mg/g-10,000 unit/g-0.1 % ophthalmic ointment APPLY 0.25 INCH STRIP TO LEFT EYE QID UTD  1    GINKGO BILOBA (GINKOBA PO) Take  by mouth.  cholecalciferol (VITAMIN D3) 1,000 unit tablet Take 3,000 Units by mouth daily.  rosuvastatin (CRESTOR) 20 mg tablet Take 20 mg by mouth daily (after dinner).   [facility] LANDIS PO Take  by mouth.  vitamin e (E GEMS) 1,000 unit capsule Take 1,000 Units by mouth daily.  coenzyme q10 (CO Q-10) 10 mg cap Take  by mouth.  ALFALFA PO Take  by mouth.  omega-3 fatty acids-vitamin e (FISH OIL) 1,000 mg Cap Take 1 Cap by mouth daily. Past Medical History:   Diagnosis Date    Acute upper respiratory infection     Anxiety disorder     Arthritis     Costal chondritis     Dyslipidemia     Dyspnea     Fibromyalgia     Hypotension     Interstitial lung disease (HCC)     Jaw pain     Mitral valve prolapse     pt denies this    Pernicious anemia     Primary fibromyalgia syndrome     Pure hypercholesterolemia     Quadricuspid aortic valve ?? ECHO 7/14    Rheumatoid arthritis(714.0)     Visceral leishmaniasis        Past Surgical History:   Procedure Laterality Date    HX CATARACT REMOVAL Bilateral     HX HEART CATHETERIZATION  2004    normal    HX PARTIAL HYSTERECTOMY      HX TONSILLECTOMY         Social History     Social History    Marital status:      Spouse name: N/A    Number of children: N/A    Years of education: N/A     Occupational History    Not on file. Social History Main Topics    Smoking status: Former Smoker     Packs/day: 0.25     Quit date: 4/15/1975    Smokeless tobacco: Never Used    Alcohol use 0.0 oz/week     0 Standard drinks or equivalent per week      Comment: occasional    Drug use: No    Sexual activity: Yes     Partners: Male     Other Topics Concern    Not on file     Social History Narrative       Patient does not have an advanced directive on file    Visit Vitals    /88 (BP 1 Location: Right arm, BP Patient Position: Sitting)    Pulse 95    Temp 98.6 °F (37 °C) (Tympanic)    Resp 20    Ht 5' 5\" (1.651 m)    SpO2 98%       Physical Exam   Cardiovascular: Normal rate and regular rhythm. Exam reveals no gallop. No murmur heard. Pulmonary/Chest: She has no wheezes. She has no rales. Abdominal: Soft. She exhibits no distension. There is no tenderness. Neurological:   Patient is distraught. She exhibits poor memory and relies on her  to answer all questions       BMI:  [facility] Outpatient Visit on 05/30/2018   Component Date Value Ref Range Status    TSH 05/30/2018 2.27  0.36 - 3.74 uIU/mL Final   Hospital Outpatient Visit on 05/24/2018   Component Date Value Ref Range Status    Creatinine, POC 05/24/2018 0.9  0.6 - 1.3 MG/DL Final    GFRAA, POC 05/24/2018 >60  >60 ml/min/1.73m2 Final    GFRNA, POC 05/24/2018 >60  >60 ml/min/1.73m2 Final    Comment: Estimated GFR is calculated using the IDMS-traceable Modification of Diet in Renal Disease (MDRD) Study equation, reported for both  Americans (GFRAA) and non- Americans (GFRNA), and normalized to 1.73m2 body surface area. The physician must decide which value applies to the patient. The MDRD study equation should only be used in individuals age 25 or older. It has not been validated for the following: pregnant women, patients with serious comorbid conditions, or on certain medications, or persons with extremes of body size, muscle mass, or nutritional status.      Hospital Outpatient Visit on 04/26/2018   Component Date Value Ref Range Status    Special Requests: 04/26/2018 NO SPECIAL REQUESTS    Final    Culture result: 04/26/2018 >100,000 COLONIES/mL ESCHERICHIA COLI*   Final   Hospital Outpatient Visit on 04/18/2018   Component Date Value Ref Range Status    Special Requests: 04/18/2018 NO SPECIAL REQUESTS    Final    Culture result: 04/18/2018 >100,000 COLONIES/mL ESCHERICHIA COLI*   Final   Office Visit on 04/18/2018   Component Date Value Ref Range Status    Color (UA POC) 04/18/2018 Yellow   Final    Clarity (UA POC) 04/18/2018 Clear   Final    Glucose (UA POC) 04/18/2018 Negative  Negative Final    Bilirubin (UA POC) 04/18/2018 Negative  Negative Final    Ketones (UA POC) 04/18/2018 Negative Negative Final    Specific gravity (UA POC) 04/18/2018 1.015  1.001 - 1.035 Final    Blood (UA POC) 04/18/2018 Negative  Negative Final    pH (UA POC) 04/18/2018 7.5  4.6 - 8.0 Final    Protein (UA POC) 04/18/2018 Negative  Negative Final    Urobilinogen (UA POC) 04/18/2018 0.2 mg/dL  0.2 - 1 Final    Nitrites (UA POC) 04/18/2018 Negative  Negative Final    Leukocyte esterase (UA POC) 04/18/2018 3+  Negative Final   Hospital Outpatient Visit on 04/06/2018   Component Date Value Ref Range Status    Sed rate, automated 04/06/2018 18  0 - 30 mm/hr Final    C-Reactive protein 04/06/2018 3.3* 0 - 0.3 mg/dL Final       .  Results for orders placed or performed during the hospital encounter of 05/30/18   TSH 3RD GENERATION   Result Value Ref Range    TSH 2.27 0.36 - 3.74 uIU/mL       Assessment / Plan      ICD-10-CM ICD-9-CM    1. Depression, unspecified depression type F32.9 311 METHYLMALONIC ACID      TSH 3RD GENERATION   2. Encephalopathy G93.40 348.30 METHYLMALONIC ACID      TSH 3RD GENERATION   3. Dysuria R30.0 788. 1 URINALYSIS W/MICROSCOPIC      CULTURE, URINE       Labs  Discontinue \"nonessential\" medications  Razadyne  Zoloft  Seroquel  Increase Ativan  Prednisone  Estrace    Follow-up Disposition:  Return in about 5 days (around 6/4/2018). I asked Natalie Guzman if she has any questions and I answered the questions. Beena Watson states that she understands the treatment plan and agrees with the treatment plan

## 2018-05-31 DIAGNOSIS — G93.40 ACUTE ENCEPHALOPATHY: Primary | ICD-10-CM

## 2018-05-31 NOTE — PROGRESS NOTES
Osmani Juan is a 68 y.o. female presenting today for Urinary Frequency and Anxiety  . Urinary Frequency    Associated symptoms include frequency. Anxiety   Pertinent negatives include no chest pain and no headaches.   :  Osmani Juan presents to the office today for memory loss. The patient and her  reports over the last several weeks she has had difficult remembering  events and she is overly anxious. Mrs. Guzman  notes her memory appears to decline throughout the evening. She takes her Ativan but continues to feel anxious. The patient is overly concerned about have Alzheimers disease. She is negative for any headache or other pain. Review of Systems   Respiratory: Negative for cough. Cardiovascular: Negative for chest pain and palpitations. Genitourinary: Positive for frequency. Neurological: Negative for headaches. Psychiatric/Behavioral: Positive for memory loss. The patient is nervous/anxious. Allergies   Allergen Reactions    Seafood Anaphylaxis    Antihistimine Swelling    Iodine Other (comments)     Wheezing and choking         Current Outpatient Prescriptions   Medication Sig Dispense Refill    LORazepam (ATIVAN) 0.5 mg tablet TAKE 1 TABLET BY MOUTH THREE TIMES DAILY 90 Tab 0    neomycin-polymyxin-dexamethasone (DEXACINE) 3.5 mg/g-10,000 unit/g-0.1 % ophthalmic ointment APPLY 0.25 INCH STRIP TO LEFT EYE QID UTD  1    GINKGO BILOBA (GINKOBA PO) Take  by mouth.  cholecalciferol (VITAMIN D3) 1,000 unit tablet Take 3,000 Units by mouth daily.  cyanocobalamin 1,000 mcg tablet Take 2,000 mcg by mouth daily.  rosuvastatin (CRESTOR) 20 mg tablet Take 20 mg by mouth daily (after dinner). 4    HAWTHORN BERRY PO Take  by mouth.  vitamin e (E GEMS) 1,000 unit capsule Take 1,000 Units by mouth daily.  coenzyme q10 (CO Q-10) 10 mg cap Take  by mouth.  ALFALFA PO Take  by mouth.       hydroxychloroquine (PLAQUENIL) 200 mg tablet Take 200 mg by mouth two (2) times a day.  multivitamin (ONE A DAY) tablet Take 1 Tab by mouth daily.  omega-3 fatty acids-vitamin e (FISH OIL) 1,000 mg Cap Take 1 Cap by mouth daily.  CALCIUM CARB/VIT D3/MINERALS (CALCIUM 600 + MINERALS PO) Take 600 mg by mouth daily.  galantamine (RAZADYNE) 4 mg tablet 1 tab twice per day 60 Tab 1    sertraline (ZOLOFT) 25 mg tablet 1 tablet daily 30 Tab 2    predniSONE (DELTASONE) 10 mg tablet 1 tab daily 30 Tab 1    QUEtiapine (SEROQUEL) 25 mg tablet TAKE 1 TABLET BY MOUTH EVERY EVENING 90 Tab 2    estradiol (ESTRACE) 0.5 mg tablet TAKE 1 TABLET BY MOUTH DAILY 90 Tab 2       Past Medical History:   Diagnosis Date    Acute upper respiratory infection     Anxiety disorder     Arthritis     Costal chondritis     Dyslipidemia     Dyspnea     Fibromyalgia     Hypotension     Interstitial lung disease (HCC)     Jaw pain     Mitral valve prolapse     pt denies this    Pernicious anemia     Primary fibromyalgia syndrome     Pure hypercholesterolemia     Quadricuspid aortic valve ?? ECHO 7/14    Rheumatoid arthritis(714.0)     Visceral leishmaniasis        Past Surgical History:   Procedure Laterality Date    HX CATARACT REMOVAL Bilateral     HX HEART CATHETERIZATION  2004    normal    HX PARTIAL HYSTERECTOMY      HX TONSILLECTOMY         Social History     Social History    Marital status:      Spouse name: N/A    Number of children: N/A    Years of education: N/A     Occupational History    Not on file.      Social History Main Topics    Smoking status: Former Smoker     Packs/day: 0.25     Quit date: 4/15/1975    Smokeless tobacco: Never Used    Alcohol use 0.0 oz/week     0 Standard drinks or equivalent per week      Comment: occasional    Drug use: No    Sexual activity: Yes     Partners: Male     Other Topics Concern    Not on file     Social History Narrative       Patient does not have an advanced directive on file    Vitals:    05/17/18 1519   BP: 120/80   Pulse: 96   Resp: 16   Temp: 98.1 °F (36.7 °C)   TempSrc: Tympanic   SpO2: 98%   Weight: 120 lb (54.4 kg)   Height: 5' 5\" (1.651 m)   PainSc:   7   PainLoc: Pelvic       Physical Exam   Constitutional: No distress. Cardiovascular: Normal rate and regular rhythm. Pulmonary/Chest: Effort normal and breath sounds normal.   Psychiatric: Her mood appears anxious. She is agitated. She exhibits abnormal recent memory. Vitals reviewed. No results found for any visits on 05/17/18. Assessment / Plan:  Memory loss- MRI ordered  HO- UTI  Continue Ativan  F/u in 1 week    Follow-up Disposition:  Return if symptoms worsen or fail to improve. I asked the patient if she  had any questions and answered her  questions. The patient stated that she understands the treatment plan and agrees with the treatment plan      .

## 2018-06-01 ENCOUNTER — TELEPHONE (OUTPATIENT)
Dept: INTERNAL MEDICINE CLINIC | Age: 73
End: 2018-06-01

## 2018-06-01 RX ORDER — SULFAMETHOXAZOLE AND TRIMETHOPRIM 800; 160 MG/1; MG/1
1 TABLET ORAL 2 TIMES DAILY
Qty: 20 TAB | Refills: 0 | Status: SHIPPED | OUTPATIENT
Start: 2018-06-01 | End: 2018-06-11

## 2018-06-01 NOTE — TELEPHONE ENCOUNTER
Patient remains confused. I informed the patient of a possible persistence of a UTI. I ordered Septra.

## 2018-06-04 ENCOUNTER — OFFICE VISIT (OUTPATIENT)
Dept: INTERNAL MEDICINE CLINIC | Age: 73
End: 2018-06-04

## 2018-06-04 VITALS
WEIGHT: 127 LBS | HEIGHT: 65 IN | TEMPERATURE: 99.4 F | DIASTOLIC BLOOD PRESSURE: 80 MMHG | HEART RATE: 100 BPM | SYSTOLIC BLOOD PRESSURE: 128 MMHG | BODY MASS INDEX: 21.16 KG/M2 | OXYGEN SATURATION: 97 % | RESPIRATION RATE: 22 BRPM

## 2018-06-04 DIAGNOSIS — G93.40 ENCEPHALOPATHY: Primary | ICD-10-CM

## 2018-06-04 LAB — METHYLMALONATE SERPL-SCNC: 81 NMOL/L (ref 0–378)

## 2018-06-04 NOTE — PROGRESS NOTES
1. Have you been to the ER, urgent care clinic since your last visit? Hospitalized since your last visit? No    2. Have you seen or consulted any other health care providers outside of the [address] [place] [address] since your last visit? Include any pap smears or colon screening.  No

## 2018-06-04 NOTE — PROGRESS NOTES
The patient returns for follow up of a severe encephalopathy. She remains quite confused. She remains tearful. She is totally dependent on her . She complains of a fairly severe burning in her skin. The TSH and MMA are normal.  The patient has seen no improvement on the medications. I discussed the situation with the patient and her . I will not make merrill changes and await the neuro appointment in 2 days. I will call the neurologist to give him a verbal update in addition to the records that have been forwarded. A total of 10 minutes was spent with the patient.  Greater than 50% of this time was spent in discussion of the problem, counseling the patient, and coordinating the care regarding the problem of acute encephalopathy

## 2018-06-07 ENCOUNTER — TELEPHONE (OUTPATIENT)
Dept: INTERNAL MEDICINE CLINIC | Age: 73
End: 2018-06-07

## 2018-06-07 NOTE — TELEPHONE ENCOUNTER
Patients  called to let Dr Judie David know how the Neurology visit went. He stated that it went pretty good. She has an appointment for Neuro psych testing on July 12th. He prescribed memantine 5 mg once a day for 2 weeks then 10 mg once a day there on out. Please call him at [phone].

## 2018-06-08 ENCOUNTER — TELEPHONE (OUTPATIENT)
Dept: INTERNAL MEDICINE CLINIC | Age: 73
End: 2018-06-08

## 2018-06-08 NOTE — TELEPHONE ENCOUNTER
Prior Auth request received from Pharmacy for Estradiol. PA initiated on [email or web address].  Approval received from insurance and Mr Charmayneeliecer Felix informed of insurance decision

## 2018-06-11 ENCOUNTER — OFFICE VISIT (OUTPATIENT)
Dept: INTERNAL MEDICINE CLINIC | Age: 73
End: 2018-06-11

## 2018-06-11 ENCOUNTER — HOSPITAL ENCOUNTER (OUTPATIENT)
Dept: LAB | Age: 73
Discharge: HOME | End: 2018-06-11
Payer: MEDICARE

## 2018-06-11 VITALS
BODY MASS INDEX: 21.16 KG/M2 | HEIGHT: 65 IN | DIASTOLIC BLOOD PRESSURE: 68 MMHG | RESPIRATION RATE: 20 BRPM | HEART RATE: 102 BPM | SYSTOLIC BLOOD PRESSURE: 120 MMHG | TEMPERATURE: 98.9 F | WEIGHT: 127 LBS | OXYGEN SATURATION: 97 %

## 2018-06-11 DIAGNOSIS — G93.40 ENCEPHALOPATHY: ICD-10-CM

## 2018-06-11 DIAGNOSIS — N30.00 ACUTE CYSTITIS WITHOUT HEMATURIA: ICD-10-CM

## 2018-06-11 DIAGNOSIS — F41.9 ANXIETY DISORDER, UNSPECIFIED TYPE: Primary | ICD-10-CM

## 2018-06-11 PROCEDURE — 87086 URINE CULTURE/COLONY COUNT: CPT | Performed by: INTERNAL MEDICINE

## 2018-06-11 RX ORDER — LORAZEPAM 0.5 MG/1
TABLET ORAL
Qty: 90 TAB | Refills: 1 | Status: SHIPPED | OUTPATIENT
Start: 2018-06-11 | End: 2018-07-19 | Stop reason: ALTCHOICE

## 2018-06-11 RX ORDER — MEMANTINE HYDROCHLORIDE 10 MG/1
TABLET ORAL
Refills: 4 | COMMUNITY
Start: 2018-06-06 | End: 2018-06-24 | Stop reason: ALTCHOICE

## 2018-06-11 RX ORDER — MEMANTINE HYDROCHLORIDE 5 MG/1
TABLET ORAL
Refills: 0 | COMMUNITY
Start: 2018-06-06 | End: 2018-06-24 | Stop reason: ALTCHOICE

## 2018-06-11 RX ORDER — HYDROXYCHLOROQUINE SULFATE 200 MG/1
TABLET, FILM COATED ORAL
Refills: 1 | COMMUNITY
Start: 2018-04-05 | End: 2018-06-24 | Stop reason: ALTCHOICE

## 2018-06-11 NOTE — PROGRESS NOTES
1. Have you been to the ER, urgent care clinic since your last visit? Hospitalized since your last visit? No    2. Have you seen or consulted any other health care providers outside of the [address] [place] [address] since your last visit? Include any pap smears or colon screening.  No

## 2018-06-12 ENCOUNTER — TELEPHONE (OUTPATIENT)
Dept: INTERNAL MEDICINE CLINIC | Age: 73
End: 2018-06-12

## 2018-06-12 NOTE — PROGRESS NOTES
The patient is doing some better but she remains a bit confused and anxious. 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 Neuropsych is ordered. The patient remains anxious but she is less dependent on her . I discussed the situation at length with patient and her . Will continue current medcations but will discontinue Razadyne and cut the Prednisone to 1/2 tab  A total of 10 minutes was spent with the patient.  Greater than 50% of this time was spent in discussion of the problem, counseling the patient, and coordinating the care regarding the problem of anxiety - depression - encephalopathy

## 2018-06-13 LAB
BACTERIA SPEC CULT: NORMAL
SERVICE CMNT-IMP: NORMAL

## 2018-06-21 ENCOUNTER — HOSPITAL ENCOUNTER (OUTPATIENT)
Dept: PET IMAGING | Age: 73
Discharge: HOME | End: 2018-06-21
Attending: PSYCHIATRY & NEUROLOGY
Payer: MEDICARE

## 2018-06-21 DIAGNOSIS — F02.80 OTHER FRONTOTEMPORAL DEMENTIA: ICD-10-CM

## 2018-06-21 DIAGNOSIS — G31.09 OTHER FRONTOTEMPORAL DEMENTIA: ICD-10-CM

## 2018-06-21 PROCEDURE — 78608 BRAIN IMAGING (PET): CPT

## 2018-06-22 ENCOUNTER — OFFICE VISIT (OUTPATIENT)
Dept: INTERNAL MEDICINE CLINIC | Age: 73
End: 2018-06-22

## 2018-06-22 VITALS
BODY MASS INDEX: 19.83 KG/M2 | WEIGHT: 119 LBS | DIASTOLIC BLOOD PRESSURE: 72 MMHG | RESPIRATION RATE: 22 BRPM | HEIGHT: 65 IN | TEMPERATURE: 98.9 F | HEART RATE: 96 BPM | OXYGEN SATURATION: 98 % | SYSTOLIC BLOOD PRESSURE: 118 MMHG

## 2018-06-22 DIAGNOSIS — F41.9 ANXIETY: Primary | ICD-10-CM

## 2018-06-22 DIAGNOSIS — M79.10 MYALGIA: ICD-10-CM

## 2018-06-22 RX ORDER — SERTRALINE HYDROCHLORIDE 50 MG/1
TABLET, FILM COATED ORAL
Qty: 90 TAB | Refills: 2 | Status: SHIPPED | OUTPATIENT
Start: 2018-06-22 | End: 2018-10-24 | Stop reason: SDUPTHER

## 2018-06-22 RX ORDER — CLONAZEPAM 1 MG/1
TABLET ORAL
Qty: 60 TAB | Refills: 3 | Status: SHIPPED | OUTPATIENT
Start: 2018-06-22 | End: 2018-08-30 | Stop reason: ALTCHOICE

## 2018-06-22 RX ORDER — QUETIAPINE FUMARATE 50 MG/1
TABLET, FILM COATED ORAL
Qty: 90 TAB | Refills: 3 | Status: SHIPPED | OUTPATIENT
Start: 2018-06-22 | End: 2019-06-19 | Stop reason: SDUPTHER

## 2018-06-22 RX ORDER — HYDROCODONE BITARTRATE AND ACETAMINOPHEN 5; 325 MG/1; MG/1
TABLET ORAL
Qty: 21 TAB | Refills: 0 | Status: SHIPPED | OUTPATIENT
Start: 2018-06-22 | End: 2018-06-29 | Stop reason: SDUPTHER

## 2018-06-22 RX ORDER — NALOXONE HYDROCHLORIDE 4 MG/.1ML
SPRAY NASAL
Qty: 2 EACH | Refills: 2 | Status: SHIPPED | OUTPATIENT
Start: 2018-06-22 | End: 2019-04-06 | Stop reason: ALTCHOICE

## 2018-06-22 RX ORDER — QUETIAPINE FUMARATE 50 MG/1
TABLET, FILM COATED ORAL
Qty: 30 TAB | Refills: 3 | Status: SHIPPED | OUTPATIENT
Start: 2018-06-22 | End: 2018-06-22 | Stop reason: SDUPTHER

## 2018-06-22 RX ORDER — SERTRALINE HYDROCHLORIDE 50 MG/1
TABLET, FILM COATED ORAL
Qty: 30 TAB | Refills: 2 | Status: SHIPPED | OUTPATIENT
Start: 2018-06-22 | End: 2018-06-22 | Stop reason: SDUPTHER

## 2018-06-22 NOTE — PROGRESS NOTES
1. Have you been to the ER, urgent care clinic since your last visit? Hospitalized since your last visit? No    2. Have you seen or consulted any other health care providers outside of the [address] [place] [address] since your last visit? Include any pap smears or colon screening.  No

## 2018-06-24 NOTE — PROGRESS NOTES
The patient remains quite anxious and tearful. She is slightly improved but she still feels unable to function. The patient complains of pain in \"all her joints and muscles. \"   I discussed the situatin with the patient and her . Will increase the Zoloft to 50 mg. Will use Norco for the pain - The Prescription Monitoring Program registry was checked prior to the issuing of this prescription for a controlled substance. Will also prescribe Narcan. A total of 10 minutes was spent with the patient.  Greater than 50% of this time was spent in discussion of the problem, counseling the patient, and coordinating the care regarding the problem of severe anxiety with depression

## 2018-06-26 ENCOUNTER — TELEPHONE (OUTPATIENT)
Dept: INTERNAL MEDICINE CLINIC | Age: 73
End: 2018-06-26

## 2018-06-26 NOTE — TELEPHONE ENCOUNTER
Patients  called to let Dr Deanne Garcia know she is responding well to the medication. It is helping with the pain but is knocking her out. He didn't know if Dr Deanne Garcia would want to cut the dose back or not. Please advise at [phone].

## 2018-06-29 DIAGNOSIS — M79.10 MYALGIA: ICD-10-CM

## 2018-06-29 RX ORDER — HYDROCODONE BITARTRATE AND ACETAMINOPHEN 5; 325 MG/1; MG/1
TABLET ORAL
Qty: 21 TAB | Refills: 0 | Status: SHIPPED | OUTPATIENT
Start: 2018-06-29 | End: 2018-08-03 | Stop reason: ALTCHOICE

## 2018-07-05 ENCOUNTER — HOSPITAL ENCOUNTER (OUTPATIENT)
Dept: LAB | Age: 73
Discharge: HOME | End: 2018-07-05
Payer: MEDICARE

## 2018-07-05 ENCOUNTER — OFFICE VISIT (OUTPATIENT)
Dept: INTERNAL MEDICINE CLINIC | Age: 73
End: 2018-07-05

## 2018-07-05 VITALS
TEMPERATURE: 98.2 F | RESPIRATION RATE: 22 BRPM | SYSTOLIC BLOOD PRESSURE: 138 MMHG | HEART RATE: 89 BPM | OXYGEN SATURATION: 97 % | DIASTOLIC BLOOD PRESSURE: 76 MMHG | HEIGHT: 65 IN

## 2018-07-05 DIAGNOSIS — R20.2 PARESTHESIA: ICD-10-CM

## 2018-07-05 DIAGNOSIS — G93.40 ENCEPHALOPATHY: ICD-10-CM

## 2018-07-05 DIAGNOSIS — F41.9 ANXIETY: ICD-10-CM

## 2018-07-05 DIAGNOSIS — F41.9 ANXIETY: Primary | ICD-10-CM

## 2018-07-05 LAB
CRP SERPL-MCNC: <0.3 MG/DL (ref 0–0.3)
ERYTHROCYTE [SEDIMENTATION RATE] IN BLOOD: 7 MM/HR (ref 0–30)

## 2018-07-05 PROCEDURE — 85652 RBC SED RATE AUTOMATED: CPT | Performed by: INTERNAL MEDICINE

## 2018-07-05 PROCEDURE — 86780 TREPONEMA PALLIDUM: CPT | Performed by: INTERNAL MEDICINE

## 2018-07-05 PROCEDURE — 36415 COLL VENOUS BLD VENIPUNCTURE: CPT | Performed by: INTERNAL MEDICINE

## 2018-07-05 PROCEDURE — 86140 C-REACTIVE PROTEIN: CPT | Performed by: INTERNAL MEDICINE

## 2018-07-05 RX ORDER — GABAPENTIN 100 MG/1
CAPSULE ORAL
Qty: 90 CAP | Refills: 3 | Status: SHIPPED | OUTPATIENT
Start: 2018-07-05 | End: 2018-10-23

## 2018-07-06 LAB — T PALLIDUM AB SER QL IA: NONREACTIVE

## 2018-07-07 NOTE — PROGRESS NOTES
The patient is doing some better but she remains quite anxious. She remains confused with dependence on her . The patient complains of burning skin - all over. Currently most marked over her forearms. The patient appears anxious but she is in a bit more control. Will do additional lab work. Will add Neurontin. A total of 10 minutes was spent with the patient.  Greater than 50% of this time was spent in discussion of the problem, counseling the patient, and coordinating the care regarding the problem of anxiety and encephalopathy

## 2018-07-19 ENCOUNTER — OFFICE VISIT (OUTPATIENT)
Dept: INTERNAL MEDICINE CLINIC | Age: 73
End: 2018-07-19

## 2018-07-19 VITALS
SYSTOLIC BLOOD PRESSURE: 126 MMHG | HEIGHT: 65 IN | DIASTOLIC BLOOD PRESSURE: 80 MMHG | TEMPERATURE: 97.9 F | RESPIRATION RATE: 22 BRPM | OXYGEN SATURATION: 96 % | HEART RATE: 83 BPM

## 2018-07-19 DIAGNOSIS — G44.319 ACUTE POST-TRAUMATIC HEADACHE, NOT INTRACTABLE: Primary | ICD-10-CM

## 2018-07-19 DIAGNOSIS — G93.40 ENCEPHALOPATHY: ICD-10-CM

## 2018-07-19 DIAGNOSIS — F41.9 ANXIETY: ICD-10-CM

## 2018-07-19 RX ORDER — MEMANTINE HYDROCHLORIDE 10 MG/1
10 TABLET ORAL
COMMUNITY
Start: 2018-07-16 | End: 2018-08-02 | Stop reason: ALTCHOICE

## 2018-07-19 NOTE — MR AVS SNAPSHOT
[address] 
 
 
 [address], [address] [place] [address] 
[phone] Patient: Shabana Miller MRN: [id number] Visit Information Date & Time Provider Department Dept. Phone Encounter #  
 7/19/2018  3:30 PM Luigi Johnson MD [facility] [place] [phone] [id number] Your Appointments 8/2/2018 12:45 PM  
Follow Up with Luigi Johnson MD  
[address]) Appt Note: 2wk  
 [address], [address] [place] [address]  
  
   
 [address], [address] [facility] [address] Upcoming Health Maintenance Date Due Hepatitis C Screening 1945 COLONOSCOPY 4/18/1963 ZOSTER VACCINE AGE 60> 2/18/2005 Pneumococcal 65+ Low/Medium Risk (2 of 2 - PPSV23) 10/25/2017 BREAST CANCER SCRN MAMMOGRAM 1/25/2018 Influenza Age 5 to Adult 8/1/2018 GLAUCOMA SCREENING Q2Y 2/28/2019 MEDICARE YEARLY EXAM 4/19/2019 DTaP/Tdap/Td series (3 - Td) 10/26/2025 Allergies as of 7/19/2018  Review Complete On: 7/19/2018 By: Luigi Johnson MD  
  
 Severity Noted Reaction Type Reactions Seafood High 02/12/2018    Anaphylaxis Antihistimine   Side Effect Swelling Iodine  02/12/2018    Other (comments) Wheezing and choking Current Immunizations  Reviewed on 7/3/2018 Name Date Influenza High Dose Vaccine PF 9/18/2017, 10/25/2016 Influenza Vaccine 9/17/2012 12:00 AM  
 Influenza Vaccine (Quad) PF 10/26/2015 Pneumococcal Conjugate (PCV-13) 10/25/2016 Tdap 10/26/2015, 10/1/2013 Not reviewed this visit You Were Diagnosed With   
  
 Codes Comments Acute post-traumatic headache, not intractable    -  Primary ICD-10-CM: D42.665 ICD-9-CM: 339.21 Vitals  BP Pulse Temp Resp Height(growth percentile) SpO2  
 126/80 (BP 1 Location: Left arm, BP Patient Position: Sitting) 83 97.9 °F (36.6 °C) (Tympanic) 22 5' 5\" (1.651 m) 96% OB Status Smoking Status Hysterectomy Former Smoker Preferred Pharmacy Pharmacy Name Phone [address] [facility] 22 [address]  [facility] at [phone] Your Updated Medication List  
  
   
This list is accurate as of 7/19/18  4:16 PM.  Always use your most recent med list.  
  
  
  
  
 clonazePAM 1 mg tablet Commonly known as:  KlonoPIN  
1 tab twice per day (Stop Ativan)  
  
 estradiol 0.5 mg tablet Commonly known as:  ESTRACE  
TAKE 1 TABLET BY MOUTH DAILY  
  
 gabapentin 100 mg capsule Commonly known as:  NEURONTIN  
1 cap three times per day HYDROcodone-acetaminophen 5-325 mg per tablet Commonly known as:  NORCO  
1 tab three times per day  
  
 memantine 10 mg tablet Commonly known as:  NAMENDA  
  
 naloxone 4 mg/actuation nasal spray Commonly known as:  [employer] Use 1 spray intranasally into 1 nostril. Use a new Narcan nasal spray for subsequent doses and administer into alternating nostrils. May repeat every 2 to 3 minutes as needed. QUEtiapine 50 mg tablet Commonly known as:  SEROquel TAKE 1 TABLET BY MOUTH AT BEDTIME  
  
 sertraline 50 mg tablet Commonly known as:  ZOLOFT  
TAKE 1 TABLET BY MOUTH DAILY  
  
 vitamin e 1,000 unit capsule Commonly known as:  E GEMS Take 1,000 Units by mouth daily. To-Do List   
 07/19/2018 Imaging:  CT HEAD WO CONT Introducing [facility] SERVICES! Dear Deana Baker: 
Thank you for requesting a [employer] account. Our records indicate that you already have an active [employer] account. You can access your account anytime at [email or web address] Did you know that you can access your hospital and ER discharge instructions at any time in [employer]? You can also review all of your test results from your hospital stay or ER visit. Additional Information If you have questions, please visit the Frequently Asked Questions section of the [employer] website at [email or web address] Remember, [employer] is NOT to be used for urgent needs. For medical emergencies, dial 911. Now available from your iPhone and Android! Please provide this summary of care documentation to your next provider. Your primary care clinician is listed as Jose D Diop. If you have any questions after today's visit, please call [phone].

## 2018-07-21 NOTE — PROGRESS NOTES
The patient persists with confusion and anxiety. Her  feels that she may be slightly worse over the past week. The patient had hit her head one week ago. The patient has had a dull headache since then. Neuro psych testing is in progress  Will increase Namenda to twice per day  A total of 10 minutes was spent with the patient and her .  Greater than 50% of this time was spent in discussion of the problem, counseling the patient, and coordinating the care regarding the problem of confusion and anxiety

## 2018-07-24 ENCOUNTER — HOSPITAL ENCOUNTER (OUTPATIENT)
Dept: CT IMAGING | Age: 73
Discharge: HOME | End: 2018-07-24
Attending: INTERNAL MEDICINE
Payer: MEDICARE

## 2018-07-24 DIAGNOSIS — G44.319 ACUTE POST-TRAUMATIC HEADACHE, NOT INTRACTABLE: ICD-10-CM

## 2018-07-24 PROCEDURE — 70450 CT HEAD/BRAIN W/O DYE: CPT

## 2018-07-31 DIAGNOSIS — G93.40 ENCEPHALOPATHY: Primary | ICD-10-CM

## 2018-08-02 ENCOUNTER — OFFICE VISIT (OUTPATIENT)
Dept: INTERNAL MEDICINE CLINIC | Age: 73
End: 2018-08-02

## 2018-08-02 VITALS
RESPIRATION RATE: 18 BRPM | HEIGHT: 65 IN | SYSTOLIC BLOOD PRESSURE: 130 MMHG | DIASTOLIC BLOOD PRESSURE: 72 MMHG | HEART RATE: 68 BPM | TEMPERATURE: 98 F | OXYGEN SATURATION: 99 %

## 2018-08-02 DIAGNOSIS — R41.3 MEMORY LOSS: Primary | ICD-10-CM

## 2018-08-02 DIAGNOSIS — F41.9 ANXIETY: ICD-10-CM

## 2018-08-02 RX ORDER — MEMANTINE HYDROCHLORIDE 10 MG/1
TABLET ORAL
Qty: 60 TAB | Refills: 3 | Status: SHIPPED | OUTPATIENT
Start: 2018-08-02 | End: 2018-12-10 | Stop reason: SDUPTHER

## 2018-08-02 NOTE — PROGRESS NOTES
Chief Complaint Patient presents with  Anxiety  
  follow up Depression Screening: PHQ over the last two weeks 10/25/2016 PHQ Not Done Active Diagnosis of Depression or Bipolar Disorder Little interest or pleasure in doing things - Feeling down, depressed, irritable, or hopeless - Total Score PHQ 2 - Learning Assessment: 
Learning Assessment 9/26/2016 PRIMARY LEARNER Patient PRIMARY LANGUAGE ENGLISH  
LEARNER PREFERENCE PRIMARY DEMONSTRATION  
ANSWERED BY Patient RELATIONSHIP SELF Abuse Screening: No flowsheet data found. Fall Risk Fall Risk Assessment, last 12 mths 8/2/2018 Able to walk? Yes Fall in past 12 months? No  
Fall with injury? -  
Number of falls in past 12 months - Fall Risk Score -  
 
 
 
 
 
1. Have you been to the ER, urgent care clinic since your last visit? Hospitalized since your last visit? No 
 
2. Have you seen or consulted any other health care providers outside of the [address] [place] [address] since your last visit? Include any pap smears or colon screening.  No

## 2018-08-03 NOTE — PROGRESS NOTES
The patient continues with marked problems with her memory. She sees no change. Her  had 1 1/2 days of near total clarity. This relapsed into her present state. There is no obvious reason for the temporary improvement or the relapse. Today the patient is anxious and tearful. Overall she appears some better that er last 2 appointments. I discussed the PET scan results with the patient . I advised her to go through with the neurpsych testing. I will plan for a referral to [facility] neurology. Will continue the present medications without change. I asked the patient and her  for any questions and I answered their questions. They state that they understand the treatment plan and agree with the treatment plan A total of 10 minutes was spent with the patient. Greater than 50% of this time was spent in discussion of the problem, counseling the patient, and coordinating the care regarding the problem of memory loss and anxiety

## 2018-08-07 ENCOUNTER — TELEPHONE (OUTPATIENT)
Dept: INTERNAL MEDICINE CLINIC | Age: 73
End: 2018-08-07

## 2018-08-07 NOTE — TELEPHONE ENCOUNTER
Patient's  states [address] scheduled his wife to be seen on 10/2. Patient also has appointments 8/7 and 8/20 with psychiatrist and he feels these records may be helpful for Pe[facility] to have as well. She will follow up with Dr. Berta Levy on 8/30.

## 2018-08-30 ENCOUNTER — OFFICE VISIT (OUTPATIENT)
Dept: INTERNAL MEDICINE CLINIC | Age: 73
End: 2018-08-30

## 2018-08-30 VITALS
TEMPERATURE: 97.8 F | HEART RATE: 78 BPM | HEIGHT: 65 IN | DIASTOLIC BLOOD PRESSURE: 82 MMHG | BODY MASS INDEX: 21.83 KG/M2 | SYSTOLIC BLOOD PRESSURE: 130 MMHG | OXYGEN SATURATION: 98 % | WEIGHT: 131 LBS

## 2018-08-30 DIAGNOSIS — F41.9 ANXIETY: ICD-10-CM

## 2018-08-30 DIAGNOSIS — R41.3 MEMORY LOSS: Primary | ICD-10-CM

## 2018-08-30 NOTE — PROGRESS NOTES
The patient presents to the office today with the chief complaint of memory loss HPI The patient continues with anxiety and poor memory. She is slowly improving but she is still upset with her memory. The patient has tapered back the Klonopin. She is doing ok without Klonopin for the past 3 days. Review of Systems Respiratory: Negative for shortness of breath. Cardiovascular: Negative for palpitations and leg swelling. Psychiatric/Behavioral: The patient is nervous/anxious. Allergies Allergen Reactions  Seafood Anaphylaxis  Antihistimine Swelling  Iodine Other (comments) Wheezing and choking Current Outpatient Prescriptions Medication Sig Dispense Refill  memantine (NAMENDA) 10 mg tablet 1 tablet twice per day (note:  New directions) 60 Tab 3  
 gabapentin (NEURONTIN) 100 mg capsule 1 cap three times per day 90 Cap 3  
 naloxone (NARCAN) 4 mg/actuation nasal spray Use 1 spray intranasally into 1 nostril. Use a new Narcan nasal spray for subsequent doses and administer into alternating nostrils. May repeat every 2 to 3 minutes as needed. 2 Each 2  
 QUEtiapine (SEROQUEL) 50 mg tablet TAKE 1 TABLET BY MOUTH AT BEDTIME 90 Tab 3  
 sertraline (ZOLOFT) 50 mg tablet TAKE 1 TABLET BY MOUTH DAILY 90 Tab 2 Past Medical History:  
Diagnosis Date  Acute upper respiratory infection  Anxiety disorder  Arthritis  Costal chondritis  Dyslipidemia  Dyspnea  Fibromyalgia  Hypotension  Interstitial lung disease ([address]  Jaw pain  Mitral valve prolapse   
 pt denies this  Pernicious anemia  Primary fibromyalgia syndrome  Pure hypercholesterolemia  Quadricuspid aortic valve ?? ECHO 7/14  Rheumatoid arthritis(714.0)  Visceral leishmaniasis Past Surgical History:  
Procedure Laterality Date  HX CATARACT REMOVAL Bilateral   
 HX HEART CATHETERIZATION  2004  
 normal  
  HX PARTIAL HYSTERECTOMY  HX TONSILLECTOMY Social History Social History  Marital status:  Spouse name: N/A  
 Number of children: N/A  
 Years of education: N/A Occupational History  Not on file. Social History Main Topics  Smoking status: Former Smoker Packs/day: 0.25 Quit date: 4/15/1975  Smokeless tobacco: Never Used  Alcohol use 0.0 oz/week  
  0 Standard drinks or equivalent per week Comment: occasional  
 Drug use: No  
 Sexual activity: Yes  
  Partners: Male Other Topics Concern  Not on file Social History Narrative Patient does not have an advanced directive on file Visit Vitals  /82 (BP 1 Location: Left arm, BP Patient Position: Sitting)  Pulse 78  Temp 97.8 °F (36.6 °C) (Tympanic)  Ht 5' 5\" (1.651 m)  Wt 131 lb (59.4 kg)  SpO2 98%  BMI 21.8 kg/m2 Physical Exam  
Cardiovascular: Exam reveals no gallop. No murmur heard. Pulmonary/Chest: She has no wheezes. She has no rales. Psychiatric:  
Patient is anxious but improved from before BMI:  OK Hospital Outpatient Visit on 07/05/2018 Component Date Value Ref Range Status  C-Reactive protein 07/05/2018 <0.3  0 - 0.3 mg/dL Final  
 Sed rate, automated 07/05/2018 7  0 - 30 mm/hr Final  
 T. pallidum interpretation. 07/05/2018 NONREACTIVE  NR   Final  
 Comment: NEW METHOD 
(NOTE) A nonreactive test result does not exclude the possibility of  
exposure to or infection with syphilis. T. palidum antibodies may be  
undetectable in some stages of the infection and in some clinical  
conditions. Hospital Outpatient Visit on 06/11/2018 Component Date Value Ref Range Status  Special Requests: 06/11/2018 NO SPECIAL REQUESTS    Final  
 Culture result: 06/11/2018 NO GROWTH 1 DAY    Final  
 
 
.No results found for any visits on 08/30/18. Assessment / Plan ICD-10-CM ICD-9-CM 1.  Memory loss R41.3 780.93   
 2. Anxiety F41.9 300.00   
 
 
she was advised to continue her maintenance medications Follow-up Disposition: 
Return in about 3 weeks (around 9/20/2018). I asked Dangelo Guzman if she has any questions and I answered the questions. Beena Aguayo states that she understands the treatment plan and agrees with the treatment plan

## 2018-08-30 NOTE — PROGRESS NOTES
Lora Mendes presents today for Chief Complaint Patient presents with  Well Woman Lora Mendes preferred language for health care discussion is english. Is someone accompanying this pt? Yes  Is the patient using any DME equipment during OV? no 
 
Depression Screening: PHQ over the last two weeks 10/25/2016 PHQ Not Done Active Diagnosis of Depression or Bipolar Disorder Little interest or pleasure in doing things - Feeling down, depressed, irritable, or hopeless - Total Score PHQ 2 - Learning Assessment: 
Learning Assessment 9/26/2016 PRIMARY LEARNER Patient PRIMARY LANGUAGE ENGLISH  
LEARNER PREFERENCE PRIMARY DEMONSTRATION  
ANSWERED BY Patient RELATIONSHIP SELF Abuse Screening: No flowsheet data found. Fall Risk Fall Risk Assessment, last 12 mths 8/30/2018 Able to walk? Yes Fall in past 12 months? No  
Fall with injury? -  
Number of falls in past 12 months - Fall Risk Score - Health Maintenance reviewed and discussed and ordered per Provider. Health Maintenance Due Topic Date Due  
 Hepatitis C Screening  1945  COLONOSCOPY  04/18/1963  ZOSTER VACCINE AGE 60>  02/18/2005  Pneumococcal 65+ Low/Medium Risk (2 of 2 - PPSV23) 10/25/2017  BREAST CANCER SCRN MAMMOGRAM  01/25/2018  Influenza Age 5 to Adult  08/01/2018 Mabeline Sink Pt currently taking Antiplatelet therapy? no 
 
Coordination of Care: 1. Have you been to the ER, urgent care clinic since your last visit? No  Hospitalized since your last visit? no 
 
2. Have you seen or consulted any other health care providers outside of the [facility]? no 
 
 
 
 
 
.

## 2018-08-30 NOTE — MR AVS SNAPSHOT
[facility] 
 
 
 [address], [address] [place] [address] 
[phone] Patient: Cruz Peralta MRN: [id number] Visit Information Date & Time Provider Department Dept. Phone Encounter #  
 8/30/2018 12:45 PM Elsa Membreno MD [facility] INTERNAL MEDICINE OF [address] [id number] Your Appointments 9/19/2018 12:00 PM  
Follow Up with Elsa Membreno MD  
[facility]: 3 1/2 wk f/u  
 [address], [address] [place] [address]  
  
   
 [address], [address] [place] [address] Upcoming Health Maintenance Date Due Hepatitis C Screening 1945 COLONOSCOPY 4/18/1963 ZOSTER VACCINE AGE 60> 2/18/2005 Pneumococcal 65+ Low/Medium Risk (2 of 2 - PPSV23) 10/25/2017 BREAST CANCER SCRN MAMMOGRAM 1/25/2018 Influenza Age 5 to Adult 8/1/2018 GLAUCOMA SCREENING Q2Y 2/28/2019 MEDICARE YEARLY EXAM 4/19/2019 DTaP/Tdap/Td series (3 - Td) 10/26/2025 Allergies as of 8/30/2018  Review Complete On: 8/30/2018 By: Elsa Membreno MD  
  
 Severity Noted Reaction Type Reactions Seafood High 02/12/2018    Anaphylaxis Antihistimine   Side Effect Swelling Iodine  02/12/2018    Other (comments) Wheezing and choking Current Immunizations  Reviewed on 7/3/2018 Name Date Influenza High Dose Vaccine PF 9/18/2017, 10/25/2016 Influenza Vaccine 9/17/2012 12:00 AM  
 Influenza Vaccine (Quad) PF 10/26/2015 Pneumococcal Conjugate (PCV-13) 10/25/2016 Tdap 10/26/2015, 10/1/2013 Not reviewed this visit Vitals BP Pulse Temp Height(growth percentile) Weight(growth percentile) 130/82 (BP 1 Location: Left arm, BP Patient Position: Sitting) 78 97.8 °F (36.6 °C) (Tympanic) 5' 5\" (1.651 m) 131 lb (59.4 kg) SpO2 BMI OB Status Smoking Status 98% 21.8 kg/m2 Hysterectomy Former Smoker BMI and BSA Data Body Mass Index Body Surface Area  
 21.8 kg/m 2 1.65 m 2 Preferred Pharmacy Pharmacy Name Phone [address], [address] [facility] 1478 [facility] [phone] Your Updated Medication List  
  
   
This list is accurate as of 8/30/18  1:34 PM.  Always use your most recent med list.  
  
  
  
  
 gabapentin 100 mg capsule Commonly known as:  NEURONTIN  
1 cap three times per day  
  
 memantine 10 mg tablet Commonly known as:  NAMENDA  
1 tablet twice per day (note:  New directions)  
  
 naloxone 4 mg/actuation nasal spray Commonly known as:  [employer] Use 1 spray intranasally into 1 nostril. Use a new Narcan nasal spray for subsequent doses and administer into alternating nostrils. May repeat every 2 to 3 minutes as needed. QUEtiapine 50 mg tablet Commonly known as:  SEROquel TAKE 1 TABLET BY MOUTH AT BEDTIME  
  
 sertraline 50 mg tablet Commonly known as:  ZOLOFT  
TAKE 1 TABLET BY MOUTH DAILY Introducing [facility] SERVICES! Dear Nadiya Sullivan: 
Thank you for requesting a [employer] account. Our records indicate that you already have an active [employer] account. You can access your account anytime at [email or web address] "[email or web address]/[email or web address] Did you know that you can access your hospital and ER discharge instructions at any time in [employer]? You can also review all of your test results from your hospital stay or ER visit. Additional Information If you have questions, please visit the Frequently Asked Questions section of the [employer] website at [email or web address] "[email or web address] Remember, [employer] is NOT to be used for urgent needs. For medical emergencies, dial 911. Now available from your iPhone and Android! Please provide this summary of care documentation to your next provider. Your primary care clinician is listed as Orion Lack.  If you have any questions after today's visit, please call [phone].

## 2018-09-19 ENCOUNTER — OFFICE VISIT (OUTPATIENT)
Dept: INTERNAL MEDICINE CLINIC | Age: 73
End: 2018-09-19

## 2018-09-19 VITALS
HEART RATE: 88 BPM | DIASTOLIC BLOOD PRESSURE: 78 MMHG | TEMPERATURE: 98.2 F | SYSTOLIC BLOOD PRESSURE: 126 MMHG | OXYGEN SATURATION: 98 % | HEIGHT: 65 IN

## 2018-09-19 DIAGNOSIS — Z23 ENCOUNTER FOR IMMUNIZATION: ICD-10-CM

## 2018-09-19 DIAGNOSIS — F41.9 ANXIETY: ICD-10-CM

## 2018-09-19 DIAGNOSIS — R41.3 MEMORY LOSS: Primary | ICD-10-CM

## 2018-09-19 NOTE — PROGRESS NOTES
Chief Complaint   Patient presents with    Well Woman       Depression Screening:  PHQ over the last two weeks 10/25/2016   PHQ Not Done Active Diagnosis of Depression or Bipolar Disorder   Little interest or pleasure in doing things -   Feeling down, depressed, irritable, or hopeless -   Total Score PHQ 2 -       Learning Assessment:  Learning Assessment 9/26/2016   PRIMARY LEARNER Patient   PRIMARY LANGUAGE ENGLISH   LEARNER PREFERENCE PRIMARY DEMONSTRATION   ANSWERED BY Patient   RELATIONSHIP SELF       Abuse Screening:  No flowsheet data found. Fall Risk  Fall Risk Assessment, last 12 mths 8/30/2018   Able to walk? Yes   Fall in past 12 months? No   Fall with injury? -   Number of falls in past 12 months -   Fall Risk Score -               1. Have you been to the ER, urgent care clinic since your last visit? Hospitalized since your last visit? No    2. Have you seen or consulted any other health care providers outside of the [address] [place] [address] since your last visit? Include any pap smears or colon screening.  No

## 2018-10-03 NOTE — PROGRESS NOTES
The patient persists with anxiety and a poor memory. She is improving on the medications with less anxiety and improving memory. The patient conversed more with this examiner during this visit. She was initially calm but she became slightly more anxious as the visit progressed. I recommended continuing the current medicaitons  I asked Columbus Heimlich. Sapienza if she has any questions and I answered the questions. Beena LEVYCornelio Olvera states that she understands the treatment plan and agrees with the treatment plan  A total of 10 minutes was spent with the patient.  Greater than 50% of this time was spent in discussion of the problem, counseling the patient, and coordinating the care regarding the problem of anxiety and memory loss

## 2018-10-23 ENCOUNTER — OFFICE VISIT (OUTPATIENT)
Dept: INTERNAL MEDICINE CLINIC | Age: 73
End: 2018-10-23

## 2018-10-23 VITALS
TEMPERATURE: 99.1 F | BODY MASS INDEX: 23.49 KG/M2 | HEIGHT: 65 IN | WEIGHT: 141 LBS | SYSTOLIC BLOOD PRESSURE: 150 MMHG | HEART RATE: 92 BPM | OXYGEN SATURATION: 97 % | DIASTOLIC BLOOD PRESSURE: 80 MMHG

## 2018-10-23 DIAGNOSIS — R20.2 PARESTHESIA: Primary | ICD-10-CM

## 2018-10-23 DIAGNOSIS — G93.40 ENCEPHALOPATHY: ICD-10-CM

## 2018-10-23 DIAGNOSIS — F41.9 ANXIETY: ICD-10-CM

## 2018-10-23 DIAGNOSIS — R41.3 MEMORY LOSS: ICD-10-CM

## 2018-10-23 RX ORDER — GABAPENTIN 100 MG/1
CAPSULE ORAL
Qty: 90 CAP | Refills: 3
Start: 2018-10-23 | End: 2019-07-18 | Stop reason: SDUPTHER

## 2018-10-23 NOTE — PROGRESS NOTES
Demond Curiel presents today for Chief Complaint Patient presents with  Well Woman  
  6 week F/u Demond Williamridge preferred language for health care discussion is english. Is someone accompanying this pt? no 
 
Is the patient using any DME equipment during OV? no 
 
Depression Screening: PHQ over the last two weeks 10/25/2016 PHQ Not Done Active Diagnosis of Depression or Bipolar Disorder Little interest or pleasure in doing things - Feeling down, depressed, irritable, or hopeless - Total Score PHQ 2 - Learning Assessment: 
Learning Assessment 9/26/2016 PRIMARY LEARNER Patient PRIMARY LANGUAGE ENGLISH  
LEARNER PREFERENCE PRIMARY DEMONSTRATION  
ANSWERED BY Patient RELATIONSHIP SELF Abuse Screening: No flowsheet data found. Fall Risk Fall Risk Assessment, last 12 mths 10/23/2018 Able to walk? Yes Fall in past 12 months? No  
Fall with injury? -  
Number of falls in past 12 months - Fall Risk Score - Health Maintenance reviewed and discussed and ordered per Provider. Health Maintenance Due Topic Date Due  
 Hepatitis C Screening  1945  COLONOSCOPY  04/18/1963  Shingrix Vaccine Age 50> (1 of 2) 04/18/1995  Pneumococcal 65+ Low/Medium Risk (2 of 2 - PPSV23) 10/25/2017  BREAST CANCER SCRN MAMMOGRAM  01/25/2018 Yuri Smith Leeelia Curiel is updated on all  Pt currently taking Antiplatelet therapy? no 
 
Coordination of Care: 1. Have you been to the ER, urgent care clinic since your last visit? no Hospitalized since your last visit? no 
 
2. Have you seen or consulted any other health care providers outside of the [address] [place] [address] since your last visit? no Include any pap smears or colon screening.  no

## 2018-10-24 NOTE — TELEPHONE ENCOUNTER
Requested Prescriptions     Pending Prescriptions Disp Refills    sertraline (ZOLOFT) 50 mg tablet 90 Tab 2      patient  call and said the doses was increased  by Dr Onofre Crystal to twice daily not  Once need new prescription call in thanks  Please advise

## 2018-10-24 NOTE — PROGRESS NOTES
The patient presents to the office today with the chief complaint of paresthesias HPI The patient is consistently improving with her encephalopathy and memory loss. She is now \"much closer\" to normal.  The patient's anxiety is also doing much better. The continues with complaints of tingling in her hands and feet. The patient remains on Neurontin which she tolerates fairly well. Review of Systems Respiratory: Negative for shortness of breath. Cardiovascular: Negative for chest pain and leg swelling. Neurological:  
     Paresthesias as per HPI Allergies Allergen Reactions  Seafood Anaphylaxis  Antihistimine Swelling  Iodine Other (comments) Wheezing and choking Current Outpatient Medications Medication Sig Dispense Refill  gabapentin (NEURONTIN) 100 mg capsule 2 caps three times per day 90 Cap 3  
 memantine (NAMENDA) 10 mg tablet 1 tablet twice per day (note:  New directions) 60 Tab 3  
 QUEtiapine (SEROQUEL) 50 mg tablet TAKE 1 TABLET BY MOUTH AT BEDTIME 90 Tab 3  
 sertraline (ZOLOFT) 50 mg tablet TAKE 1 TABLET BY MOUTH DAILY 90 Tab 2  
 naloxone (NARCAN) 4 mg/actuation nasal spray Use 1 spray intranasally into 1 nostril. Use a new Narcan nasal spray for subsequent doses and administer into alternating nostrils. May repeat every 2 to 3 minutes as needed. 2 Each 2 Past Medical History:  
Diagnosis Date  Acute upper respiratory infection  Anxiety disorder  Arthritis  Costal chondritis  Dyslipidemia  Dyspnea  Fibromyalgia  Hypotension  Interstitial lung disease ([address]  Jaw pain  Mitral valve prolapse   
 pt denies this  Pernicious anemia  Primary fibromyalgia syndrome  Pure hypercholesterolemia  Quadricuspid aortic valve ?? ECHO 7/14  Rheumatoid arthritis(714.0)  Visceral leishmaniasis Past Surgical History:  
Procedure Laterality Date  HX CATARACT REMOVAL Bilateral   
  HX HEART CATHETERIZATION  2004  
 normal  
 HX PARTIAL HYSTERECTOMY  HX TONSILLECTOMY Social History Socioeconomic History  Marital status:  Spouse name: Not on file  Number of children: Not on file  Years of education: Not on file  Highest education level: Not on file Social Needs  Financial resource strain: Not on file  Food insecurity - worry: Not on file  Food insecurity - inability: Not on file  Transportation needs - medical: Not on file  Transportation needs - non-medical: Not on file Occupational History  Not on file Tobacco Use  Smoking status: Former Smoker Packs/day: 0.25 Last attempt to quit: 4/15/1975 Years since quittin.5  Smokeless tobacco: Never Used Substance and Sexual Activity  Alcohol use: Yes Alcohol/week: 0.0 oz  
  Comment: occasional  
 Drug use: No  
 Sexual activity: Yes  
  Partners: Male Other Topics Concern  Not on file Social History Narrative  Not on file Patient does not have an advanced directive on file Visit Vitals /80 (BP 1 Location: Left arm, BP Patient Position: Sitting) Pulse 92 Temp 99.1 °F (37.3 °C) Ht 5' 5\" (1.651 m) Wt 141 lb (64 kg) SpO2 97% BMI 23.46 kg/m² Physical Exam 
 
BMI:  OK No visits with results within 3 Month(s) from this visit. Latest known visit with results is:  
Hospital Outpatient Visit on 2018 Component Date Value Ref Range Status  C-Reactive protein 2018 <0.3  0 - 0.3 mg/dL Final  
 Sed rate, automated 2018 7  0 - 30 mm/hr Final  
 T. pallidum interpretation. 2018 NONREACTIVE  NR   Final  
 Comment: NEW METHOD 
(NOTE) A nonreactive test result does not exclude the possibility of  
exposure to or infection with syphilis. T. palidum antibodies may be  
undetectable in some stages of the infection and in some clinical  
conditions. .No results found for any visits on 10/23/18. Assessment / Plan ICD-10-CM ICD-9-CM 1. Paresthesia R20.2 782.0 2. Memory loss R41.3 780.93   
3. Anxiety F41.9 300.00   
4. Encephalopathy G93.40 348.30 Increase Neurontin to 100 mg cap -- 2 cap TID 
she was advised to continue her maintenance medications Follow-up Disposition: 
Return in about 3 months (around 1/23/2019). I asked Neelima Roblesr Thomas if she has any questions and I answered the questions. Beena Fritz states that she understands the treatment plan and agrees with the treatment plan

## 2018-10-25 RX ORDER — SERTRALINE HYDROCHLORIDE 50 MG/1
TABLET, FILM COATED ORAL
Qty: 90 TAB | Refills: 2 | Status: SHIPPED | OUTPATIENT
Start: 2018-10-25 | End: 2019-04-24 | Stop reason: SDUPTHER

## 2018-11-29 ENCOUNTER — OFFICE VISIT (OUTPATIENT)
Dept: INTERNAL MEDICINE CLINIC | Age: 73
End: 2018-11-29

## 2018-11-29 VITALS
OXYGEN SATURATION: 97 % | TEMPERATURE: 98.5 F | HEIGHT: 65 IN | HEART RATE: 83 BPM | BODY MASS INDEX: 24.49 KG/M2 | WEIGHT: 147 LBS | DIASTOLIC BLOOD PRESSURE: 88 MMHG | SYSTOLIC BLOOD PRESSURE: 130 MMHG

## 2018-11-29 DIAGNOSIS — G93.40 ENCEPHALOPATHY: Primary | ICD-10-CM

## 2018-11-29 DIAGNOSIS — F51.5 NIGHTMARES: ICD-10-CM

## 2018-11-29 DIAGNOSIS — Z23 ENCOUNTER FOR IMMUNIZATION: ICD-10-CM

## 2018-11-29 NOTE — PROGRESS NOTES
Patient presented to office for Pneumovax 23 0.5 ml injection. Allergies noted. Patient well and consenting to injection. Injection given intramuscular in right deltoid. Patient tolerated injection well and left office ambulatory. David Lucas presents today for No chief complaint on file. David Lucas preferred language for health care discussion is english. Is someone accompanying this pt? Yes     Is the patient using any DME equipment during [address]? no    Depression Screening:  PHQ over the last two weeks 10/25/2016   PHQ Not Done Active Diagnosis of Depression or Bipolar Disorder   Little interest or pleasure in doing things -   Feeling down, depressed, irritable, or hopeless -   Total Score PHQ 2 -       Learning Assessment:  Learning Assessment 9/26/2016   PRIMARY LEARNER Patient   PRIMARY LANGUAGE ENGLISH   LEARNER PREFERENCE PRIMARY DEMONSTRATION   ANSWERED BY Patient   RELATIONSHIP SELF       Abuse Screening:  No flowsheet data found. Fall Risk  Fall Risk Assessment, last 12 mths 10/23/2018   Able to walk? Yes   Fall in past 12 months? No   Fall with injury? -   Number of falls in past 12 months -   Fall Risk Score -       Health Maintenance reviewed and discussed and ordered per Provider. Health Maintenance Due   Topic Date Due    Hepatitis C Screening  1945    COLONOSCOPY  04/18/1963    Shingrix Vaccine Age 50> (1 of 2) 04/18/1995    Pneumococcal 65+ Low/Medium Risk (2 of 2 - PPSV23) 10/25/2017    BREAST CANCER SCRN MAMMOGRAM  01/25/2018   . Pt currently taking Antiplatelet therapy? no    Coordination of Care:  1. Have you been to the ER, urgent care clinic since your last visit? no Hospitalized since your last visit? no    2.  Have you seen or consulted any other health care providers outside of the [address] [place] [address] since your last visit? no Include any pap smears or colon screening. no        .

## 2018-12-01 NOTE — PROGRESS NOTES
The patient presents to the office today with the chief complaint of encephalopathy    HPI    The patient remains on medications related to recent encephalopathy. She is showing progressive improvement. She is close to being [place] old self. \"   She is sleeping ok but at times she has night candelario and screams out in her sleep. Apparently she has done some of this before but it now worse. Review of Systems   Respiratory: Negative for shortness of breath. Cardiovascular: Negative for chest pain and leg swelling. Psychiatric/Behavioral:        Sleep issues as per HPI       Allergies   Allergen Reactions    Seafood Anaphylaxis    Antihistimine Swelling    Iodine Other (comments)     Wheezing and choking         Current Outpatient Medications   Medication Sig Dispense Refill    sertraline (ZOLOFT) 50 mg tablet 1 tab daily 90 Tab 2    gabapentin (NEURONTIN) 100 mg capsule 2 caps three times per day 90 Cap 3    memantine (NAMENDA) 10 mg tablet 1 tablet twice per day (note:  New directions) 60 Tab 3    QUEtiapine (SEROQUEL) 50 mg tablet TAKE 1 TABLET BY MOUTH AT BEDTIME 90 Tab 3    naloxone (NARCAN) 4 mg/actuation nasal spray Use 1 spray intranasally into 1 nostril. Use a new Narcan nasal spray for subsequent doses and administer into alternating nostrils. May repeat every 2 to 3 minutes as needed. 2 Each 2       Past Medical History:   Diagnosis Date    Acute upper respiratory infection     Anxiety disorder     Arthritis     Costal chondritis     Dyslipidemia     Dyspnea     Fibromyalgia     Hypotension     Interstitial lung disease (HCC)     Jaw pain     Mitral valve prolapse     pt denies this    Pernicious anemia     Primary fibromyalgia syndrome     Pure hypercholesterolemia     Quadricuspid aortic valve ??      ECHO 7/14    Rheumatoid arthritis(714.0)     Visceral leishmaniasis        Past Surgical History:   Procedure Laterality Date    HX CATARACT REMOVAL Bilateral     HX HEART CATHETERIZATION  2004    normal    HX PARTIAL HYSTERECTOMY      HX TONSILLECTOMY         Social History     Socioeconomic History    Marital status:      Spouse name: Not on file    Number of children: Not on file    Years of education: Not on file    Highest education level: Not on file   Social Needs    Financial resource strain: Not on file    Food insecurity - worry: Not on file    Food insecurity - inability: Not on file    Transportation needs - medical: Not on file   [employer] needs - non-medical: Not on file   Occupational History    Not on file   Tobacco Use    Smoking status: Former Smoker     Packs/day: 0.25     Last attempt to quit: 4/15/1975     Years since quittin.6    Smokeless tobacco: Never Used   Substance and Sexual Activity    Alcohol use: Yes     Alcohol/week: 0.0 oz     Comment: occasional    Drug use: No    Sexual activity: Yes     Partners: Male   Other Topics Concern    Not on file   Social History Narrative    Not on file       Patient does not have an advanced directive on file    Visit Vitals  /88 (BP 1 Location: Left arm, BP Patient Position: Sitting)   Pulse 83   Temp 98.5 °F (36.9 °C) (Tympanic)   Ht 5' 5\" (1.651 m)   Wt 147 lb (66.7 kg)   SpO2 97%   BMI 24.46 kg/m²       Physical Exam   Cardiovascular: Normal rate and regular rhythm. Exam reveals no gallop. No murmur heard. Pulmonary/Chest: She has no wheezes. She has no rales. Musculoskeletal: She exhibits no edema. BMI:  OK    No visits with results within 3 Month(s) from this visit. Latest known visit with results is:   Hospital Outpatient Visit on 2018   Component Date Value Ref Range Status    C-Reactive protein 2018 <0.3  0 - 0.3 mg/dL Final    Sed rate, automated 2018 7  0 - 30 mm/hr Final    T. pallidum interpretation.  2018 NONREACTIVE  NR   Final    Comment: NEW METHOD  (NOTE)  A nonreactive test result does not exclude the possibility of exposure to or infection with syphilis. T. palidum antibodies may be   undetectable in some stages of the infection and in some clinical   conditions. .No results found for any visits on 11/29/18. Assessment / Plan      ICD-10-CM ICD-9-CM    1. Encephalopathy G93.40 348.30    2. Encounter for immunization Z23 V03.89 ADMIN PNEUMOCOCCAL VACCINE      PNEUMOCOCCAL POLYSACCHARIDE VACCINE, 23-VALENT, ADULT OR IMMUNOSUPPRESSED PT DOSE,   3. Nightmares F51.5 307.47        Change Neurontin to 100 mg -- 1/1/2  she was advised to continue her maintenance medications  Pneumovax given    Follow-up Disposition:  Return in about 3 months (around 3/5/2019). I asked Mark Guzman if she has any questions and I answered the questions. Beena Myrick states that she understands the treatment plan and agrees with the treatment plan

## 2018-12-10 RX ORDER — MEMANTINE HYDROCHLORIDE 10 MG/1
TABLET ORAL
Qty: 60 TAB | Refills: 0 | Status: SHIPPED | OUTPATIENT
Start: 2018-12-10 | End: 2019-01-07 | Stop reason: SDUPTHER

## 2019-01-08 RX ORDER — MEMANTINE HYDROCHLORIDE 10 MG/1
TABLET ORAL
Qty: 60 TAB | Refills: 0 | Status: SHIPPED | OUTPATIENT
Start: 2019-01-08 | End: 2019-02-11 | Stop reason: SDUPTHER

## 2019-01-29 ENCOUNTER — OFFICE VISIT (OUTPATIENT)
Dept: INTERNAL MEDICINE CLINIC | Age: 74
End: 2019-01-29

## 2019-01-29 VITALS
SYSTOLIC BLOOD PRESSURE: 140 MMHG | OXYGEN SATURATION: 96 % | DIASTOLIC BLOOD PRESSURE: 70 MMHG | HEIGHT: 65 IN | HEART RATE: 89 BPM | BODY MASS INDEX: 24.46 KG/M2 | TEMPERATURE: 98.5 F

## 2019-01-29 DIAGNOSIS — R07.89 LEFT-SIDED CHEST WALL PAIN: Primary | ICD-10-CM

## 2019-01-29 DIAGNOSIS — F51.5 NIGHTMARES: ICD-10-CM

## 2019-01-29 NOTE — PROGRESS NOTES
Adrien Tom presents today for Chief Complaint Patient presents with  Well Woman Adriencelestine Pantojajean marie preferred language for health care discussion is english. Is someone accompanying this pt? Yes Wife Is the patient using any DME equipment during OV? no 
 
Depression Screening: PHQ over the last two weeks 10/25/2016 PHQ Not Done Active Diagnosis of Depression or Bipolar Disorder Little interest or pleasure in doing things - Feeling down, depressed, irritable, or hopeless - Total Score PHQ 2 - Learning Assessment: 
Learning Assessment 9/26/2016 PRIMARY LEARNER Patient PRIMARY LANGUAGE ENGLISH  
LEARNER PREFERENCE PRIMARY DEMONSTRATION  
ANSWERED BY Patient RELATIONSHIP SELF Abuse Screening: No flowsheet data found. Fall Risk Fall Risk Assessment, last 12 mths 10/23/2018 Able to walk? Yes Fall in past 12 months? No  
Fall with injury? -  
Number of falls in past 12 months - Fall Risk Score - Health Maintenance reviewed and discussed and ordered per Provider. Health Maintenance Due Topic Date Due  
 Hepatitis C Screening  1945  COLONOSCOPY  04/18/1963  Shingrix Vaccine Age 50> (1 of 2) 04/18/1995  BREAST CANCER SCRN MAMMOGRAM  01/25/2018  GLAUCOMA SCREENING Q2Y  02/28/2019 Kathinorberto Pavan Tom is updated on all  Pt currently taking Antiplatelet therapy? Coordination of Care: 1. Have you been to the ER, urgent care clinic since your last visit? no Hospitalized since your last visit? no 
 
2. Have you seen or consulted any other health care providers outside of the [address] [place] [address] since your last visit? no Include any pap smears or colon screening.  no

## 2019-01-30 NOTE — PROGRESS NOTES
The patient presents to the office today with the chief complaint of nightmares HPI In general the patient is doing much better. She does not have any more confusion. The patient has mild anxiety but she is doing much better in this aspect. The patient, however, has frequent nightmares - talking in her sleep and at times shreeking in her sleep. Occasionally she sleep walks. The patient complains of pain in her left posterior ribs. No clear history of trauma Review of Systems Respiratory: Negative for shortness of breath. Cardiovascular: Positive for chest pain (As per HPI). Negative for leg swelling. Allergies Allergen Reactions  Seafood Anaphylaxis  Antihistimine Swelling  Iodine Other (comments) Wheezing and choking Current Outpatient Medications Medication Sig Dispense Refill  memantine (NAMENDA) 10 mg tablet TAKE 1 TABLET BY MOUTH TWICE DAILY 60 Tab 0  
 sertraline (ZOLOFT) 50 mg tablet 1 tab daily 90 Tab 2  
 gabapentin (NEURONTIN) 100 mg capsule 2 caps three times per day 90 Cap 3  
 naloxone (NARCAN) 4 mg/actuation nasal spray Use 1 spray intranasally into 1 nostril. Use a new Narcan nasal spray for subsequent doses and administer into alternating nostrils. May repeat every 2 to 3 minutes as needed. 2 Each 2  
 QUEtiapine (SEROQUEL) 50 mg tablet TAKE 1 TABLET BY MOUTH AT BEDTIME 90 Tab 3 Past Medical History:  
Diagnosis Date  Acute upper respiratory infection  Anxiety disorder  Arthritis  Costal chondritis  Dyslipidemia  Dyspnea  Fibromyalgia  Hypotension  Interstitial lung disease ([address]  Jaw pain  Mitral valve prolapse   
 pt denies this  Pernicious anemia  Primary fibromyalgia syndrome  Pure hypercholesterolemia  Quadricuspid aortic valve ?? ECHO 7/14  Rheumatoid arthritis(714.0)  Visceral leishmaniasis Past Surgical History:  
Procedure Laterality Date  HX CATARACT REMOVAL Bilateral   
 HX HEART CATHETERIZATION  2004  
 normal  
 HX PARTIAL HYSTERECTOMY  HX TONSILLECTOMY Social History Socioeconomic History  Marital status:  Spouse name: Not on file  Number of children: Not on file  Years of education: Not on file  Highest education level: Not on file Social Needs  Financial resource strain: Not on file  Food insecurity - worry: Not on file  Food insecurity - inability: Not on file  Transportation needs - medical: Not on file  Transportation needs - non-medical: Not on file Occupational History  Not on file Tobacco Use  Smoking status: Former Smoker Packs/day: 0.25 Last attempt to quit: 4/15/1975 Years since quittin.8  Smokeless tobacco: Never Used Substance and Sexual Activity  Alcohol use: Yes Alcohol/week: 0.0 oz  
  Comment: occasional  
 Drug use: No  
 Sexual activity: Yes  
  Partners: Male Other Topics Concern  Not on file Social History Narrative  Not on file Patient does not have an advanced directive on file Visit Vitals /70 (BP 1 Location: Right arm, BP Patient Position: Sitting) Pulse 89 Temp 98.5 °F (36.9 °C) Ht 5' 5\" (1.651 m) SpO2 96% BMI 24.46 kg/m² Physical Exam  
No Cervical Lymphadenopathy No Supraclavicular Lymphadenopathy Thyroid is Normal 
Chest wall nontender Lungs are normal to percussion. Clear to auscultation Heart:  S1 S2 are normal, No gallops, No murmurs No Carotid Bruits Abdomen:  Normal Bowel Sounds. No tenderness. No masses. No Hepatomegaly or Splenomegaly LE:  Strong Pedal Pulses. No Edema BMI:  OK No visits with results within 3 Month(s) from this visit. Latest known visit with results is:  
Hospital Outpatient Visit on 2018 Component Date Value Ref Range Status  C-Reactive protein 2018 <0.3  0 - 0.3 mg/dL Final  
  Sed rate, automated 07/05/2018 7  0 - 30 mm/hr Final  
 T. pallidum interpretation. 07/05/2018 NONREACTIVE  NR   Final  
 Comment: NEW METHOD 
(NOTE) A nonreactive test result does not exclude the possibility of  
exposure to or infection with syphilis. T. palidum antibodies may be  
undetectable in some stages of the infection and in some clinical  
conditions. .No results found for any visits on 01/29/19. Assessment / Plan ICD-10-CM ICD-9-CM 1. Left-sided chest wall pain R07.89 786.52 XR CHEST PA LAT 2. Nightmares F51.5 307.47 Chest Xray ordered 
she was advised to continue her maintenance medications Follow-up Disposition: 
Return in about 3 months (around 4/29/2019). I asked Sharita Guzman if she has any questions and I answered the questions. Beena Estrella states that she understands the treatment plan and agrees with the treatment plan

## 2019-02-06 ENCOUNTER — HOSPITAL ENCOUNTER (OUTPATIENT)
Dept: GENERAL RADIOLOGY | Age: 74
Discharge: HOME | End: 2019-02-06
Payer: MEDICARE

## 2019-02-06 DIAGNOSIS — R07.89 LEFT-SIDED CHEST WALL PAIN: ICD-10-CM

## 2019-02-06 PROCEDURE — 71046 X-RAY EXAM CHEST 2 VIEWS: CPT

## 2019-02-11 RX ORDER — MEMANTINE HYDROCHLORIDE 10 MG/1
TABLET ORAL
Qty: 180 TAB | Refills: 3 | Status: SHIPPED | OUTPATIENT
Start: 2019-02-11

## 2019-02-11 NOTE — TELEPHONE ENCOUNTER
Requested Prescriptions     Pending Prescriptions Disp Refills    memantine (NAMENDA) 10 mg tablet 180 Tab 3     Wants 90 day supply

## 2019-02-13 ENCOUNTER — TELEPHONE (OUTPATIENT)
Dept: INTERNAL MEDICINE CLINIC | Age: 74
End: 2019-02-13

## 2019-02-13 DIAGNOSIS — R91.8 OTHER NONSPECIFIC ABNORMAL FINDING OF LUNG FIELD: ICD-10-CM

## 2019-02-13 DIAGNOSIS — R93.89 ABNORMAL CHEST X-RAY: Primary | ICD-10-CM

## 2019-03-05 NOTE — TELEPHONE ENCOUNTER
Per Dr Betancur Hidden verbal order chest CT wo contrast ordered due to abnormal chest xray, order read back and confirmed, Ms Du Dates contacted and given information. She expressed understanding.

## 2019-04-02 ENCOUNTER — OFFICE VISIT (OUTPATIENT)
Dept: FAMILY MEDICINE CLINIC | Facility: CLINIC | Age: 74
End: 2019-04-02

## 2019-04-02 VITALS
HEIGHT: 65 IN | DIASTOLIC BLOOD PRESSURE: 82 MMHG | OXYGEN SATURATION: 98 % | RESPIRATION RATE: 18 BRPM | TEMPERATURE: 97.1 F | BODY MASS INDEX: 27.32 KG/M2 | SYSTOLIC BLOOD PRESSURE: 134 MMHG | WEIGHT: 164 LBS | HEART RATE: 84 BPM

## 2019-04-02 DIAGNOSIS — M79.7 PRIMARY FIBROMYALGIA SYNDROME: ICD-10-CM

## 2019-04-02 DIAGNOSIS — R93.89 ABNORMAL CT SCAN, CHEST: ICD-10-CM

## 2019-04-02 DIAGNOSIS — F41.9 ANXIETY: Primary | ICD-10-CM

## 2019-04-04 ENCOUNTER — HOSPITAL ENCOUNTER (OUTPATIENT)
Dept: CT IMAGING | Age: 74
Discharge: HOME | End: 2019-04-04
Attending: INTERNAL MEDICINE
Payer: MEDICARE

## 2019-04-04 DIAGNOSIS — R91.8 OTHER NONSPECIFIC ABNORMAL FINDING OF LUNG FIELD: ICD-10-CM

## 2019-04-04 DIAGNOSIS — R93.89 ABNORMAL CHEST X-RAY: ICD-10-CM

## 2019-04-04 PROCEDURE — 71250 CT THORAX DX C-: CPT

## 2019-04-06 NOTE — PROGRESS NOTES
The patient presents to the office today with the chief complaint of anxiety HPI The patient remains on Zoloft and Seroquel for chronic anxiety. The patient is doing well. She had problems with her mental status several months ago. This has dramatically improved. The patient has fibromyalgia. She is doing well with the fatigue and the myalgias. The patient has no complaints. Previous imaging of the chest has shown some pulmonary scarring. Review of Systems Respiratory: Negative for shortness of breath. Cardiovascular: Negative for chest pain and leg swelling. Allergies Allergen Reactions  Seafood Anaphylaxis  Antihistimine Swelling  Iodine Other (comments) Wheezing and choking Current Outpatient Medications Medication Sig Dispense Refill  memantine (NAMENDA) 10 mg tablet 1 tab twice per day 180 Tab 3  
 sertraline (ZOLOFT) 50 mg tablet 1 tab daily 90 Tab 2  
 gabapentin (NEURONTIN) 100 mg capsule 2 caps three times per day 90 Cap 3  
 QUEtiapine (SEROQUEL) 50 mg tablet TAKE 1 TABLET BY MOUTH AT BEDTIME 90 Tab 3 Past Medical History:  
Diagnosis Date  Acute upper respiratory infection  Anxiety disorder  Arthritis  Costal chondritis  Dyslipidemia  Dyspnea  Fibromyalgia  Hypotension  Interstitial lung disease ([address]  Jaw pain  Mitral valve prolapse   
 pt denies this  Pernicious anemia  Primary fibromyalgia syndrome  Pure hypercholesterolemia  Quadricuspid aortic valve ?? ECHO 7/14  Rheumatoid arthritis(714.0)  Visceral leishmaniasis Past Surgical History:  
Procedure Laterality Date  HX CATARACT REMOVAL Bilateral   
 HX HEART CATHETERIZATION  2004  
 normal  
 HX PARTIAL HYSTERECTOMY  HX TONSILLECTOMY Social History Socioeconomic History  Marital status:  Spouse name: Not on file  Number of children: Not on file  Years of education: Not on file  Highest education level: Not on file Occupational History  Not on file Social Needs  Financial resource strain: Not on file  Food insecurity:  
  Worry: Not on file Inability: Not on file  Transportation needs:  
  Medical: Not on file Non-medical: Not on file Tobacco Use  Smoking status: Former Smoker Packs/day: 0.25 Last attempt to quit: 4/15/1975 Years since quittin.0  Smokeless tobacco: Never Used Substance and Sexual Activity  Alcohol use: Yes Alcohol/week: 0.0 oz  
  Comment: occasional  
 Drug use: No  
 Sexual activity: Yes  
  Partners: Male Lifestyle  Physical activity:  
  Days per week: Not on file Minutes per session: Not on file  Stress: Not on file Relationships  Social connections:  
  Talks on phone: Not on file Gets together: Not on file Attends Christian service: Not on file Active member of club or organization: Not on file Attends meetings of clubs or organizations: Not on file Relationship status: Not on file  Intimate partner violence:  
  Fear of current or ex partner: Not on file Emotionally abused: Not on file Physically abused: Not on file Forced sexual activity: Not on file Other Topics Concern  Not on file Social History Narrative  Not on file Patient does not have an advanced directive on file Visit Vitals /82 Pulse 84 Temp 97.1 °F (36.2 °C) (Tympanic) Resp 18 Ht 5' 5\" (1.651 m) Wt 164 lb (74.4 kg) SpO2 98% BMI 27.29 kg/m² Physical Exam  
No Cervical Lymphadenopathy No Supraclavicular Lymphadenopathy Thyroid is Normal 
Lungs are normal to percussion. Clear to auscultation Heart:  S1 S2 are normal, No gallops, No murmurs No Carotid Bruits Abdomen:  Normal Bowel Sounds. No tenderness. No masses. No Hepatomegaly or Splenomegaly LE:  Strong Pedal Pulses. No Edema BMI:  OK 
 
 No visits with results within 3 Month(s) from this visit. Latest known visit with results is:  
Hospital Outpatient Visit on 07/05/2018 Component Date Value Ref Range Status  C-Reactive protein 07/05/2018 <0.3  0 - 0.3 mg/dL Final  
 Sed rate, automated 07/05/2018 7  0 - 30 mm/hr Final  
 T. pallidum interpretation. 07/05/2018 NONREACTIVE  NR   Final  
 Comment: NEW METHOD 
(NOTE) A nonreactive test result does not exclude the possibility of  
exposure to or infection with syphilis. T. palidum antibodies may be  
undetectable in some stages of the infection and in some clinical  
conditions. .No results found for any visits on 04/02/19. Assessment / Plan ICD-10-CM ICD-9-CM 1. Anxiety F41.9 300.00 2. Primary fibromyalgia syndrome M79.7 729.1 3. Abnormal CT scan, chest R93.89 793.2 CT scan of chest 
she was advised to continue her maintenance medications Follow-up and Dispositions · Return in about 4 months (around 8/2/2019). I asked Anthony Guzman if she has any questions and I answered the questions. Cecilia B. Darylene Richard states that she understands the treatment plan and agrees with the treatment plan

## 2019-04-09 ENCOUNTER — TELEPHONE (OUTPATIENT)
Dept: FAMILY MEDICINE CLINIC | Facility: CLINIC | Age: 74
End: 2019-04-09

## 2019-04-10 DIAGNOSIS — J84.10 PULMONARY FIBROSIS (HCC): Primary | ICD-10-CM

## 2019-04-12 NOTE — TELEPHONE ENCOUNTER
Per Dr. Marveen Homans he has spoke with the patient regarding the CT Scan. No further action needed.

## 2019-04-19 ENCOUNTER — CLINICAL SUPPORT (OUTPATIENT)
Dept: PULMONOLOGY | Age: 74
End: 2019-04-19

## 2019-04-19 VITALS — HEIGHT: 65 IN | BODY MASS INDEX: 27.82 KG/M2 | WEIGHT: 167 LBS

## 2019-04-19 DIAGNOSIS — J84.10 PULMONARY FIBROSIS (HCC): Primary | ICD-10-CM

## 2019-04-19 NOTE — PROGRESS NOTES
Verbal Order with read back per Dr. Julissa Taveras MD  For PFT smart panel. AMB POC PFT complete w/ bronchodilator  Gas Dilute/ wash out lung vol w/wo distrib vet & vol  Diffusing capacity    Dr. Julissa Taveras MD will co-sign the orders.

## 2019-04-24 RX ORDER — SERTRALINE HYDROCHLORIDE 50 MG/1
TABLET, FILM COATED ORAL
Qty: 180 TAB | Refills: 0 | Status: SHIPPED | OUTPATIENT
Start: 2019-04-24 | End: 2019-06-16 | Stop reason: SDUPTHER

## 2019-06-10 ENCOUNTER — HOSPITAL ENCOUNTER (OUTPATIENT)
Dept: LAB | Age: 74
Discharge: HOME | End: 2019-06-10
Payer: MEDICARE

## 2019-06-10 ENCOUNTER — OFFICE VISIT (OUTPATIENT)
Dept: PULMONOLOGY | Age: 74
End: 2019-06-10

## 2019-06-10 VITALS
HEART RATE: 101 BPM | WEIGHT: 163 LBS | RESPIRATION RATE: 16 BRPM | DIASTOLIC BLOOD PRESSURE: 82 MMHG | HEIGHT: 65 IN | OXYGEN SATURATION: 95 % | TEMPERATURE: 98.4 F | BODY MASS INDEX: 27.16 KG/M2 | SYSTOLIC BLOOD PRESSURE: 98 MMHG

## 2019-06-10 DIAGNOSIS — M06.9 RHEUMATOID ARTHRITIS, INVOLVING UNSPECIFIED SITE, UNSPECIFIED RHEUMATOID FACTOR PRESENCE: ICD-10-CM

## 2019-06-10 DIAGNOSIS — R06.02 SHORTNESS OF BREATH: ICD-10-CM

## 2019-06-10 DIAGNOSIS — R94.2 DECREASED DIFFUSION CAPACITY OF LUNG: ICD-10-CM

## 2019-06-10 DIAGNOSIS — J84.9 ILD (INTERSTITIAL LUNG DISEASE) (HCC): Primary | ICD-10-CM

## 2019-06-10 DIAGNOSIS — R91.8 ABNORMAL CT SCAN OF LUNG: ICD-10-CM

## 2019-06-10 DIAGNOSIS — J84.9 ILD (INTERSTITIAL LUNG DISEASE) (HCC): ICD-10-CM

## 2019-06-10 LAB
CK SERPL-CCNC: 50 U/L (ref 26–192)
ERYTHROCYTE [SEDIMENTATION RATE] IN BLOOD: 14 MM/HR (ref 0–30)

## 2019-06-10 PROCEDURE — 82550 ASSAY OF CK (CPK): CPT

## 2019-06-10 PROCEDURE — 86140 C-REACTIVE PROTEIN: CPT

## 2019-06-10 PROCEDURE — 86431 RHEUMATOID FACTOR QUANT: CPT

## 2019-06-10 PROCEDURE — 85652 RBC SED RATE AUTOMATED: CPT

## 2019-06-10 PROCEDURE — 86606 ASPERGILLUS ANTIBODY: CPT

## 2019-06-10 PROCEDURE — 82085 ASSAY OF ALDOLASE: CPT

## 2019-06-10 PROCEDURE — 86480 TB TEST CELL IMMUN MEASURE: CPT

## 2019-06-10 PROCEDURE — 86225 DNA ANTIBODY NATIVE: CPT

## 2019-06-10 PROCEDURE — 83520 IMMUNOASSAY QUANT NOS NONAB: CPT

## 2019-06-10 PROCEDURE — 36415 COLL VENOUS BLD VENIPUNCTURE: CPT

## 2019-06-10 PROCEDURE — 82164 ANGIOTENSIN I ENZYME TEST: CPT

## 2019-06-10 NOTE — PROGRESS NOTES
Justine Lloyd presents today for   Chief Complaint   Patient presents with    New Patient    Pulmonary Fibrosis    Cough     nonproductive    Side Pain     left side towards the back for a few weeks       Is someone accompanying this pt? Yes, patient's spouse    Is the patient using any DME equipment during [address]? No    -DME Company N/A    Depression Screening:  3 most recent PHQ Screens 6/10/2019   PHQ Not Done -   Little interest or pleasure in doing things Not at all   Feeling down, depressed, irritable, or hopeless Not at all   Total Score PHQ 2 0       Learning Assessment:  Learning Assessment 9/26/2016   PRIMARY LEARNER Patient   PRIMARY LANGUAGE ENGLISH   LEARNER PREFERENCE PRIMARY DEMONSTRATION   ANSWERED BY Patient   RELATIONSHIP SELF       Abuse Screening:  Abuse Screening Questionnaire 6/10/2019   Do you ever feel afraid of your partner? N   Are you in a relationship with someone who physically or mentally threatens you? N   Is it safe for you to go home? Y       Fall Risk  Fall Risk Assessment, last 12 mths 6/10/2019   Able to walk? Yes   Fall in past 12 months? No   Fall with injury? -   Number of falls in past 12 months -   Fall Risk Score -         Coordination of Care:  1. Have you been to the ER, urgent care clinic since your last visit? Hospitalized since your last visit? No    2. Have you seen or consulted any other health care providers outside of the [address] [place] [address] since your last visit? Include any pap smears or colon screening.  N/A    Medication variance in dosage/sig per patient as follows: No.

## 2019-06-10 NOTE — PROGRESS NOTES
[address] [facility], [facility]. [place] [address] [facility]  Pulmonary, Critical Care, and Sleep Medicine    Pulmonary Office Initial Consultation  Name: Ross Malone 76 y.o. female  MRN: [id number]  : 1945  Service Date: 06/10/19    Referring Provider: Trae Ogden MD  [address]  Chief Complaint:   Chief Complaint   Patient presents with    New Patient    Pulmonary Fibrosis    Cough     nonproductive    Side Pain     left side towards the back for a few weeks       History of Present Illness:  Ross Malone is a 76 y.o. female, who presents to Pulmonary clinic referred for ILD. Pt reports that she had some back pain and saw her PCP who performed CXR - this was in February. This showed possible ILD so pt had a CT chest done which showed progression of ILD. They report sx have been normal.  Patient denies any respiratory complaints. Patient reports good exercise tolerance. Pt denies any issues with dyspnea on exertion or shortness of breath. Pt reports she can climb 1 flight of steps multiple times per day with no issues. Patient reports no changes with dyspnea on exertion over time. Pt denies any issue with cough. Of note, the patient has been reporting that there was seen by pulmonary-Dr. Latasha Cordero about 1.5 years ago --he noted this was due to bird exposure and he advised abatement of the house. Pt denies any hx of amiodarone, MTX, nitrofuratoin, chemotx. No exotic bird exposure, hot tub, effusions, other known causes of hypersensitivity pneumonitis. Patient reports a history of rheumatoid arthritis, they are unsure of what the patient has been treated for in the past.  Pt used to follow with Dr. Jose Cabrera -- pt was on Cymbalata but stopped that.  reports she was on Plaquenil a few years ago. Pt and  cannot recall which medication was.   Pt was diagnosed with RA about 15-20 years ago.  Smoking Hx: Quit smoking 25 years ago -- prior 0.5PPD  Occ Hx:  Prior [employer] worker and [employer] worker and [employer], no prior industrial exposures to coal/silica/asbestos  Patient denies any symptoms of GERD/heartburn. Pt's  reports that pt was seen by GI a few years ago -- she had an EGD done by Dr. Bang Valentino. Patient denies chest pain/angina, nausea, vomiting, diarrhea, night sweats, hemoptysis, fevers, chills, purulent sputum, abdominal pain, LE swelling. Past Medical Hx: I have personally reviewed medical hx  Past Medical History:   Diagnosis Date    Acute upper respiratory infection     Anxiety disorder     Arthritis     Costal chondritis     Dyslipidemia     Dyspnea     Fibromyalgia     Hypotension     Interstitial lung disease (HCC)     Jaw pain     Mitral valve prolapse     pt denies this    Pernicious anemia     Primary fibromyalgia syndrome     Pulmonary fibrosis (HCC)     Pure hypercholesterolemia     Quadricuspid aortic valve ?? ECHO 7/14    Rheumatoid arthritis(714.0)     Visceral leishmaniasis      Past Surgical Hx: I have personally reviewed surgical hx  Past Surgical History:   Procedure Laterality Date    HX CATARACT REMOVAL Bilateral     HX HEART CATHETERIZATION  2004    normal    HX PARTIAL HYSTERECTOMY      HX TONSILLECTOMY       Family Hx: I have personally reviewed the family hx. No family hx of cancer or hereditary lung disease with immediate family. Family History   Problem Relation Age of Onset    Heart Attack Father         40,58    Heart Disease Father     Heart Disease Brother         History of stenting    Heart Attack Brother 61       Social Hx: I have personally reviewed the social hx.   Social History     Socioeconomic History    Marital status:      Spouse name: Not on file    Number of children: Not on file    Years of education: Not on file    Highest education level: Not on file   Occupational History  Not on file   Social Needs    Financial resource strain: Not on file    Food insecurity:     Worry: Not on file     Inability: Not on file    Transportation needs:     Medical: Not on file     Non-medical: Not on file   Tobacco Use    Smoking status: Former Smoker     Packs/day: 1.00     Years: 25.00     Pack years: 25.00     Last attempt to quit: 4/15/1975     Years since quittin.1    Smokeless tobacco: Never Used   Substance and Sexual Activity    Alcohol use: Not Currently     Alcohol/week: 0.0 oz     Comment: occasional    Drug use: No    Sexual activity: Yes     Partners: Male   Lifestyle    Physical activity:     Days per week: Not on file     Minutes per session: Not on file    Stress: Not on file   Relationships    Social connections:     Talks on phone: Not on file     Gets together: Not on file     Attends Restorationist service: Not on file     Active member of club or organization: Not on file     Attends meetings of clubs or organizations: Not on file     Relationship status: Not on file    Intimate partner violence:     Fear of current or ex partner: Not on file     Emotionally abused: Not on file     Physically abused: Not on file     Forced sexual activity: Not on file   Other Topics Concern    Not on file   Social History Narrative    Not on file     Allergies: I have reviewed the allergy hx  Allergies   Allergen Reactions    Seafood Anaphylaxis    Antihistimine Swelling    Iodine Other (comments)     Wheezing and choking         Medications:  I have reviewed the patient's medications  Prior to Admission medications    Medication Sig Start Date End Date Taking?  Authorizing Provider   sertraline (ZOLOFT) 50 mg tablet TAKE 2 TABLETS BY MOUTH EVERY DAY 19  Yes Anthony Osman MD   memantine [facility]) 10 mg tablet 1 tab twice per day 19  Yes Anthony Osman MD   gabapentin (NEURONTIN) 100 mg capsule 2 caps three times per day 10/23/18  Yes Anthony Osman MD QUEtiapine (SEROQUEL) 50 mg tablet TAKE 1 TABLET BY MOUTH AT BEDTIME 6/22/18  Yes Uriah Patton MD       Immunizations:  I have reviewed the patient's immunizations  Immunization History   Administered Date(s) Administered    Influenza High Dose Vaccine PF 10/25/2016, 09/18/2017    Influenza Vaccine 09/17/2012    Influenza Vaccine (Quad) PF 10/26/2015    Influenza Vaccine (Tri) Adjuvanted 09/19/2018    Pneumococcal Conjugate (PCV-13) 10/25/2016    Pneumococcal Polysaccharide (PPSV-23) 11/29/2018    Tdap 10/01/2013, 10/26/2015       Review of Systems:  A complete review of systems was performed as stated in the HPI, all others are negative.       Objective:    Physical Exam:  BP 98/82 (BP 1 Location: Left arm, BP Patient Position: Sitting)   Pulse (!) 101   Temp 98.4 °F (36.9 °C) (Oral)   Resp 16   Ht 5' 5\" (1.651 m)   Wt 73.9 kg (163 lb)   SpO2 95% Comment: RA rest  BMI 27.12 kg/m²   Vitals were personally reviewed  Gen: no acute distress, pleasant and cooperative, sitting up in chair, able to climb to exam table w/o difficulty  HEENT: normocephalic/atraumatic, PERRLA, EOMI, no scleral icterus, nasal turbinates have no erythema, no nasal polyps, no oral lesions, fair dentition, Mallampati III  Neck: supple, trachea midline, no JVD, no cervical and supraclavicular adenopathy  Chest: no lesions, with even rise and fall, no pectus excavatum or flail chest  CVS: regular rate rhythm, S1/S2, no murmurs/rubs/gallops  Lungs: good air entry B/L, very fine Velcro rales in bilateral bases, otherwise clear to auscultation bilaterally, no wheezes or rhonchi  Back: no kyphosis or scoliosis  Abdomen: soft, nontender, bowel sounds present, no hepatosplenomegaly  Ext: no pitting edema B/L, no peripheral cyanosis or clubbing  Neuro: grossly normal, AAOx3, normal strength and coordination grossly, no focal deficits  Skin: no rashes, erythema, lesions  Psych: Somewhat poor long-term memory, normal thought content, and processing    Labs: I have reviewed the patient's available labs  Lab Results   Component Value Date/Time    WBC 6.6 02/26/2018 04:45 PM    HGB 13.8 02/26/2018 04:45 PM    HCT 40.4 02/26/2018 04:45 PM    PLATELET 902 83/47/6291 04:45 PM    MCV 94.6 02/26/2018 04:45 PM     Lab Results   Component Value Date/Time    Sodium 141 02/26/2018 04:45 PM    Potassium 4.0 02/26/2018 04:45 PM    Chloride 105 02/26/2018 04:45 PM    CO2 28 02/26/2018 04:45 PM    Anion gap 8 02/26/2018 04:45 PM    Glucose 92 02/26/2018 04:45 PM    BUN 10 02/26/2018 04:45 PM    Creatinine 0.69 02/26/2018 04:45 PM    BUN/Creatinine ratio 14 02/26/2018 04:45 PM    GFR est AA >60 02/26/2018 04:45 PM    GFR est non-AA >60 02/26/2018 04:45 PM    Calcium 9.7 02/26/2018 04:45 PM    Bilirubin, total 0.3 02/26/2018 04:45 PM    AST (SGOT) 23 02/26/2018 04:45 PM    Alk. phosphatase 65 02/26/2018 04:45 PM    Protein, total 6.8 02/26/2018 04:45 PM    Albumin 4.0 02/26/2018 04:45 PM    Globulin 2.8 02/26/2018 04:45 PM    A-G Ratio 1.4 02/26/2018 04:45 PM    ALT (SGPT) 20 02/26/2018 04:45 PM       Outside records reviewed in clinic as follows-patient last saw PCP on 4/2/2019, patient was having CT scan for previous pulmonary scarring. Patient later referred to pulmonary for abnormal CT scan. Imaging:  I have personally reviewed patient's imaging --- CT chest from 4/4/2019 compared with CT chest from 10/10/2017 shows progression of fibrotic changes bilaterally, fibrosis seen throughout all lung fields along the periphery, some pleurally with some traction bronchiectasis is very mild/early honeycombing in the bases. This is shown a clear progression from very mild subpleural fibrotic changes related. No masses, consolidations, adenopathy seen.   Official report per radiology:  CT Results (most recent):  Results from Hospital Encounter encounter on 04/04/19   CT CHEST WO CONT    Narrative CT chest without enhancement     INDICATION: Abnormal chest radiograph. TECHNIQUE: 5 mm collimation axial images obtained from the thoracic inlet to the  level of the diaphragm without administration of low osmolar, nonionic  intravenous contrast.    Dose reduction techniques used: Automated exposure control, adjustment of the  mAs and/or kVp according to patient size, standardized low-dose protocol, and/or  iterative reconstruction technique. COMPARISON: Chest radiograph dated 2019 and chest CT dated October  10, 2017. CHEST FINDINGS:     Lymph nodes: Prominent low right paratracheal lymph node is stable. .    Thyroid: Unremarkable. Mediastinum: Heart is normal in size with trace pericardial fluid. Atherosclerosis. Patulous esophagus. Lungs: There is moderate fibrosis in the lungs with a predominantly subpleural  reticular pattern. Overall, the fibrosis is somewhat progressed from prior. There is some slight possible developing honeycombing. There is no definite  consolidative pneumonia. There is no pulmonary edema. No particularly suspicious  nodular density is appreciated. ABDOMEN:     No acute finding. Bones: Degenerative changes of the spine. Impression Impression:    Progression of pulmonary fibrosis since prior chest CT in 2017. This is  most likely a UIP type pattern.  No clearly superimposed acute pulmonary process       PFTs:  I have reviewed the patient's PFTs from today, effort was poor, spirometry was normal without bronchodilator response, lung volumes showed mild restriction, and diffusion capacity was moderately reduced    TTE:  I have reviewed the patient's TTE results  Results for orders placed or performed during the hospital encounter of 14   2D ECHO COMPLETE ADULT (TTE) W OR WO CONTR     Status: None    [address], [address] [phone]    Transthoracic Echocardiogram    Patient: Mackenzie Castellanos  MRN: [id number]  [id number]  [address] #: [de-identified]  : 1945  Age: 71 years  Gender: Female  Height: 65 in  Weight: 136 lb  BSA: 1.68 m squared  Study date: 24-Jul-2014  BP: 139 / 66 mmHg  Status: Routine  Location:  [facility] #: [id number]    Allergies: ANTIHISTIMINE    Interpreting Group:  [id number]  Interpreting Physician:  Tessy Love MD  Technologist:  HUGO Chan  Referring_Ordering Physician:  Allyson Zhang. Berta Levy MD    IMPRESSIONS:  There was a quadricuspid aortic valve with mild to moderate aortic  insufficiency. I would recommend a cardiology evaluation for establishment  of follow up. SUMMARY:  Left ventricle: Size was normal. Systolic function was normal by EF  (biplane method of disks). Ejection fraction was estimated to be 55 %. There were no regional wall motion abnormalities. Wall thickness was  normal. Doppler parameters were consistent with abnormal left ventricular  relaxation (grade 1 diastolic dysfunction), however tissue Doppler was  normal.    Right ventricle: The size was normal. Systolic function was normal.  Systolic pressure was within the normal range. Estimated RVSP was 20 mmHg. Mitral valve: There was mild annular calcification. There was mild diffuse  thickening. Aortic valve: There was a quadricuspid aortic valve, the leaflets  exhibited mildly increased thickness and good mobility. There was mild to  moderate regurgitation. Tricuspid valve: Normal valve structure. COMPARISONS:  Comparison was made with the previous study of 02-Mar-2011. INDICATIONS: systolic murmurs. HISTORY: Prior history: Patient has no history of cardiovascular disease. Recently experiencing dizziness, falling, CP, SOB. PROCEDURE: This was a routine study. The study included complete 2D  imaging, M-mode, complete spectral Doppler, and color Doppler. The heart  rate was 77 bpm, at the start of the study. Systolic blood pressure was  139 mmHg, at the start of the study.  Diastolic blood pressure was 66 mmHg,  at the start of the study. Images were obtained from the parasternal,  apical, subcostal, and suprasternal notch acoustic windows. Image quality  was adequate. LEFT VENTRICLE: Size was normal. Systolic function was normal by EF  (biplane method of disks). Ejection fraction was estimated to be 55 %. There were no regional wall motion abnormalities. Wall thickness was  normal. DOPPLER: Doppler parameters were consistent with abnormal left  ventricular relaxation (grade 1 diastolic dysfunction), however tissue  Doppler was normal.    RIGHT VENTRICLE: The size was normal. Systolic function was normal.  DOPPLER: Systolic pressure was within the normal range. Estimated RVSP was  20 mmHg. LEFT ATRIUM: Size was normal. LA volume index was 14 ml/m squared. RIGHT ATRIUM: Size was normal.    MITRAL VALVE: There was mild annular calcification. There was mild diffuse  thickening. DOPPLER: There was no evidence for stenosis. There was trivial  regurgitation. AORTIC VALVE: There was a quadricuspid aortic valve, the leaflets  exhibited mildly increased thickness and good mobility. DOPPLER: There was  no stenosis. There was mild to moderate regurgitation. TRICUSPID VALVE: Normal valve structure. DOPPLER: There was no evidence  for tricuspid stenosis. There was trivial regurgitation. Pulmonary artery  systolic pressure was within the normal range. Right atrial pressure  estimate: 5 mmHg. PULMONIC VALVE: Not well visualized, but normal Doppler findings. DOPPLER:  There was no stenosis. There was no significant regurgitation. AORTA: The root exhibited normal size. SYSTEMIC VEINS: IVC: Respirophasic changes were normal.    PERICARDIUM: No significant pericardial effusion identified.     MEASUREMENT TABLES    2D measurements  Left ventricle   (Reference normals)  LVID ed, PLAX   45.5 mm   (35-60)  LVID es, PLAX   29.9 mm   (21-40)  FS, PLAX   34.29 %   (25-46)  LVPW thickness ed   9.69 mm   (6-11)  Ratio, IVS/LVPW   1.07 (<1.3)  Vol ed, MOD2   72 ml   (--)  Vol es, MOD2   32 ml   (--)  Stroke vol   40 ml   (--)  EF   56 %   (--)  Ventricular septum   (Reference normals)  IVS thickness ed   10.4 mm   (6-11)  LVOT   (Reference normals)  Diam   20.5 mm   (--)  Aorta   (Reference normals)  Root diam   34 mm   (--)  Right ventricle   (Reference normals)  Minor axis ed, A4C   26.2 mm   (26-43)  Right atrium   (Reference normals)  Area sys   11 cm squared   (8.3-19. 5)    Doppler measurements  Left ventricle   (Reference normals)  IVRT   67 ms   ()  Ea, lat sebastián, tiss DP   10.4 cm/s   (--)  E/Ea, lat sebastián, tiss DP   7.96    (--)  Ea, med sebastián, tiss DP   9 cm/s   (--)  E/Ea, med sebastián, tiss DP   9.2    (--)  LVOT   (Reference normals)  Peak flavia   110 cm/s   (--)  Mean flavia   76.9 cm/s   (--)  VTI   20.8 cm   (--)  Peak gradient   5 mmHg   (--)  Mean gradient   2.65 mmHg   (--)  Stroke vol   68.65 ml   (--)  Aortic valve   (Reference normals)  Regurg PHT   644 ms   (--)  Mitral valve   (Reference normals)  Peak E flavia   82.8 cm/s   (--)  Peak A flavia   114 cm/s   (--)  Decel time   186 ms   (150-230)  Peak E/A ratio   0.73    (--)  Pulmonary veins   (Reference normals)  Peak Ar flavia   35.2 cm/s   (--)  Ar duration   128 ms   (--)  Right ventricle   (Reference normals)  Ea flavia, lat annulus, tiss DP   14.7 cm/s   (--)  Tricuspid valve   (Reference normals)  Regurg peak flavia   191 cm/s   (--)  RV-RA peak gradient   15 mmHg   (--)    Prepared and E-signed by    Rola Chaney MD  Signed 24-Jul-2014 18:07:37           Assessment and Plan:  76 y.o. female with:    Impression:  1. Interstitial lung disease/pulmonary fibrosis: Etiology is unclear at this time. Patient is previously seen outside pulmonary who indicated that the patient has chronic HP. Patient denies any history of surgical lung biopsy to confirm this. Imaging although noted by radiology to have a UIP pattern, does not appear to have a definite UIP pattern.   Other etiologies could include chronic HP, however patient denies any possible exposures. Patient denies any drug exposures such as amiodarone, nitrofurantoin, methotrexate, chemotherapy, etc.  Patient denies any recent smoking history suggestive of RBILD/DIP. Patient does have a history of rheumatoid arthritis for which she followed with rheumatology at one point, which may be causing her ILD. Imaging shows a clear progression of fibrosis, but patient remains relatively asymptomatic. 2.  History of rheumatoid arthritis: Patient currently is not on DMARD therapy  3. Decreased DLCO: Etiology due to above  4. Shortness of breath etiology due to above    Plan:  -I discussed the case at length with the patient and her . I reviewed potential etiologies of ILD and indicated that her causative etiology is unclear at this time. I reviewed the potential work-up including lab work, PFTs, possible bronchoscopy, and possible surgical lung biopsy. Due to the patient's age, patient and her  do not want to pursue surgical lung biopsy. We will start work-up with serologies/lab work and input from rheumatology. -Refer patient to rheumatology to rule out RA induced ILD  -Lab work ordered including CTD ILD panel, ANCA, CK, ACE, QuantiFERON, aldolase, HP panel  -Follow-up with 6 to walk test  -Request records from patient's previous pulmonologist-Dr. Seema Herzog including office notes and lab work  -Advised patient remain active    Follow-up and Dispositions    · Return in about 2 months (around 8/10/2019).        Orders Placed This Encounter    PRO PULMONARY STRESS TESTING    WALESKA COMPREHENSIVE PLUS PANEL    ANCA PANEL    RHEUMATOID FACTOR, QL    SCLERODERMA (SCL-70) AB, IGG    SED RATE (ESR)    ANGIOTENSIN CONVERTING ENZYME    C REACTIVE PROTEIN, QT    CK    SJOGREN'S ABS, SSA AND SSB    HYPERSENS PNEUMONITIS I    RHEUMASSURE    JEANNETTE-1 AB    ALDOLASE    REFERRAL TO RHEUMATOLOGY     I spent 60 minutes in this patient encounter with greater than 50% of that time spent face-to-face counseling regarding the above as well as coordinating care and discussing patient's previous care. They went in detail regarding their issues with her previous pulmonologist and her previous rheumatologist.  Patient and her  requested that patient see another rheumatologist, and I recommended they speak with her PCP-Dr. Viri Slade for a good reference. I reviewed the potential etiologies of pulmonary fibrosis and indicated to the patient that her etiology is unknown, and it would be prudent to rule out known causes such as CTD ILD, and given her history of rheumatoid arthritis, she should be seen by rheumatology for their input. I also indicated the patient that work-up comes back negative, we may consider bronchoscopy with transbronchial biopsy with [employer] genetic testing to help risk stratify the patient regarding risk of IPF. I indicated to them the treatment of IPF will be geared towards preventing the progression of fibrosis.       Rashid Casper MD/MPH     Pulmonary, Critical Care Medicine  [facility]

## 2019-06-11 LAB
ACE SERPL-CCNC: 57 U/L (ref 14–82)
ALDOLASE SERPL-CCNC: 3.4 U/L (ref 3.3–10.3)
CENTROMERE B AB SER-ACNC: <0.2 AI (ref 0–0.9)
CHROMATIN AB SERPL-ACNC: <0.2 AI (ref 0–0.9)
CRP SERPL-MCNC: <0.3 MG/DL (ref 0–0.3)
DSDNA AB SER-ACNC: <1 IU/ML (ref 0–9)
ENA JO1 AB SER-ACNC: <0.2 AI (ref 0–0.9)
ENA RNP AB SER-ACNC: <0.2 AI (ref 0–0.9)
ENA SCL70 AB SER-ACNC: <0.2 AI (ref 0–0.9)
ENA SM AB SER-ACNC: <0.2 AI (ref 0–0.9)
ENA SM+RNP AB SER-ACNC: <0.2 AI (ref 0–0.9)
ENA SS-A AB SER-ACNC: <0.2 AI (ref 0–0.9)
ENA SS-B AB SER-ACNC: <0.2 AI (ref 0–0.9)
RHEUMATOID FACT SERPL-ACNC: 14 IU/ML (ref 0–13.9)
RIBOSOMAL P AB SER-ACNC: <0.2 AI (ref 0–0.9)
SEE BELOW:, 164879: NORMAL

## 2019-06-12 LAB
C-ANCA TITR SER IF: NORMAL TITER
MYELOPEROXIDASE AB SER IA-ACNC: <9 U/ML (ref 0–9)
P-ANCA ATYPICAL TITR SER IF: NORMAL TITER
P-ANCA TITR SER IF: NORMAL TITER
PROTEINASE3 AB SER IA-ACNC: <3.5 U/ML (ref 0–3.5)

## 2019-06-14 LAB
14.3.3 ETA, RHEUM. ARTHRITIS: <0.2 NG/ML
A FUMIGATUS1 AB SER QL ID: NEGATIVE
A PULLULANS AB SER QL: NEGATIVE
CCP IGA+IGG SERPL IA-ACNC: <20 UNITS
LACEYELLA SACCHARI AB SER QL: NEGATIVE
PIGEON SERUM AB QL ID: NEGATIVE
RHEUMATOID FACT SERPL-ACNC: <14 UNITS/ML
S RECTIVIRGULA AB SER QL ID: NEGATIVE
T VULGARIS AB SER QL ID: NEGATIVE

## 2019-06-16 LAB
M TB IFN-G BLD-IMP: NEGATIVE
QUANTIFERON CRITERIA, QFI1T: NORMAL
QUANTIFERON MITOGEN VALUE: >10 IU/ML
QUANTIFERON NIL VALUE: 0.02 IU/ML
QUANTIFERON TB1 AG: 0.05 IU/ML
QUANTIFERON TB2 AG: 0.02 IU/ML

## 2019-06-17 RX ORDER — SERTRALINE HYDROCHLORIDE 50 MG/1
TABLET, FILM COATED ORAL
Qty: 180 TAB | Refills: 0 | Status: SHIPPED | OUTPATIENT
Start: 2019-06-17

## 2019-06-19 RX ORDER — QUETIAPINE FUMARATE 50 MG/1
TABLET, FILM COATED ORAL
Qty: 90 TAB | Refills: 0 | Status: SHIPPED | OUTPATIENT
Start: 2019-06-19 | End: 2019-11-06 | Stop reason: SDUPTHER

## 2019-07-09 ENCOUNTER — OFFICE VISIT (OUTPATIENT)
Dept: FAMILY MEDICINE CLINIC | Facility: CLINIC | Age: 74
End: 2019-07-09

## 2019-07-09 VITALS
OXYGEN SATURATION: 99 % | BODY MASS INDEX: 28.16 KG/M2 | HEART RATE: 87 BPM | WEIGHT: 169 LBS | HEIGHT: 65 IN | SYSTOLIC BLOOD PRESSURE: 118 MMHG | DIASTOLIC BLOOD PRESSURE: 78 MMHG | RESPIRATION RATE: 16 BRPM | TEMPERATURE: 99.2 F

## 2019-07-09 DIAGNOSIS — F41.9 ANXIETY: Primary | ICD-10-CM

## 2019-07-09 DIAGNOSIS — G93.40 ENCEPHALOPATHY: ICD-10-CM

## 2019-07-12 NOTE — PROGRESS NOTES
Acute encephalopathy no  The patient presents to the office today with the chief complaint of encephalopathy    HPI    Patient had been doing well but recently she has become a bit more anxious and her mental status is taken stepdown. She is still much better than she had been when she has severe episode months ago. ROS  Negative for headache, chest pain, lower extremity edema    Allergies   Allergen Reactions    Seafood Anaphylaxis    Antihistimine Swelling    Iodine Other (comments)     Wheezing and choking         Current Outpatient Medications   Medication Sig Dispense Refill    QUEtiapine (SEROQUEL) 50 mg tablet TAKE 1 TABLET BY MOUTH AT BEDTIME 90 Tab 0    sertraline (ZOLOFT) 50 mg tablet TAKE 2 TABLETS BY MOUTH EVERY  Tab 0    memantine (NAMENDA) 10 mg tablet 1 tab twice per day 180 Tab 3    gabapentin (NEURONTIN) 100 mg capsule 2 caps three times per day 90 Cap 3       Past Medical History:   Diagnosis Date    Acute upper respiratory infection     Anxiety disorder     Arthritis     Costal chondritis     Dyslipidemia     Dyspnea     Fibromyalgia     Hypotension     Interstitial lung disease (HCC)     Jaw pain     Mitral valve prolapse     pt denies this    Pernicious anemia     Primary fibromyalgia syndrome     Pulmonary fibrosis (HCC)     Pure hypercholesterolemia     Quadricuspid aortic valve ??      ECHO 7/14    Rheumatoid arthritis(714.0)     Visceral leishmaniasis        Past Surgical History:   Procedure Laterality Date    HX CATARACT REMOVAL Bilateral     HX HEART CATHETERIZATION  2004    normal    HX PARTIAL HYSTERECTOMY      HX TONSILLECTOMY         Social History     Socioeconomic History    Marital status:      Spouse name: Not on file    Number of children: Not on file    Years of education: Not on file    Highest education level: Not on file   Occupational History    Not on file   Social Needs    Financial resource strain: Not on file   [place] insecurity:     Worry: Not on file     Inability: Not on file    Transportation needs:     Medical: Not on file     Non-medical: Not on file   Tobacco Use    Smoking status: Former Smoker     Packs/day: 1.00     Years: 25.00     Pack years: 25.00     Last attempt to quit: 4/15/1975     Years since quittin.2    Smokeless tobacco: Never Used   Substance and Sexual Activity    Alcohol use: Not Currently     Alcohol/week: 0.0 oz     Comment: occasional    Drug use: No    Sexual activity: Yes     Partners: Male   Lifestyle    Physical activity:     Days per week: Not on file     Minutes per session: Not on file    Stress: Not on file   Relationships    Social connections:     Talks on phone: Not on file     Gets together: Not on file     Attends Synagogue service: Not on file     Active member of club or organization: Not on file     Attends meetings of clubs or organizations: Not on file     Relationship status: Not on file    Intimate partner violence:     Fear of current or ex partner: Not on file     Emotionally abused: Not on file     Physically abused: Not on file     Forced sexual activity: Not on file   Other Topics Concern    Not on file   Social History Narrative    Not on file       Patient does not have an advanced directive on file    Visit Vitals  /78   Pulse 87   Temp 99.2 °F (37.3 °C) (Tympanic)   Resp 16   Ht 5' 5\" (1.651 m)   Wt 169 lb (76.7 kg)   SpO2 99%   BMI 28.12 kg/m²       Physical Exam   Patient is awake and alert. She communicates but speaks little  No Cervical Lymphadenopathy  No Supraclavicular Lymphadenopathy  Thyroid is Normal  Lungs are normal to percussion. Clear to auscultation   Heart:  S1 S2 are normal, No gallops, No murmurs  No Carotid Bruits  Abdomen:  Normal Bowel Sounds. No tenderness. No masses. No Hepatomegaly or Splenomegaly  LE:  Strong Pedal Pulses.   No Edema      BMI: [facility] Outpatient Visit on 06/10/2019   Component Date Value Ref Range Status    Anti-DNA (DS) Ab, QT 06/10/2019 <1  0 - 9 IU/mL Final    Comment: (NOTE)                                    Negative      <5                                    Equivocal  5 - 9                                    Positive      >9      RNP Abs 06/10/2019 <0.2  0.0 - 0.9 AI Final    Smith Abs 06/10/2019 <0.2  0.0 - 0.9 AI Final    Vizcaino/RNP Abs 06/10/2019 <0.2  0.0 - 0.9 AI Final    Scleroderma-70 Ab 06/10/2019 <0.2  0.0 - 0.9 AI Final    Sjogren's Anti-SS-A 06/10/2019 <0.2  0.0 - 0.9 AI Final    Sjogren's Anti-SS-B 06/10/2019 <0.2  0.0 - 0.9 AI Final    Antichromatin Ab 06/10/2019 <0.2  0.0 - 0.9 AI Final    Anti Ribosomal P Ab 06/10/2019 <0.2  0.0 - 0.9 AI Final    Anti-Maria Eugenia-1 06/10/2019 <0.2  0.0 - 0.9 AI Final    Centromere B Ab 06/10/2019 <0.2  0.0 - 0.9 AI Final    Comment: (NOTE)  Performed At: [address] [id number]  Jesus Bull MD HI:[id number]     Dipika Smith See below 06/10/2019 Comment    Final    Comment: (NOTE)  Autoantibody                       Disease Association  ____________________________________________________________                         Condition                  Frequency  _____________________   ________________________   _________  Antinuclear Antibody,    SLE, mixed connective  Direct (WALESKA-D)           tissue diseases  _____________________   ________________________   _________  dsDNA                    SLE                        40 - 60%  _____________________   ________________________   _________  Chromatin                Drug induced SLE                90%                          SLE                        48 - 97%  _____________________   ________________________   _________  [facility]                 SLE                        25 - 35%                          Sjogren's Syndrome         40 - 70%                           Lupus                 100%  _____________________   ________________________   _________  [facility] SLE                                                        10%                          Sjogren's Syndrome              30%  _____________________   _______________________    _________  Sm (anti-Smith)          SLE                        15 - 30%  _____________________   _______________________    _________  RNP                      Mixed Connective Tissue                          Disease                         95%  (U1 nRNP,                SLE                        30 - 50%  anti-ribonucleoprotein)  Polymyositis and/or                          Dermatomyositis                 20%  _____________________   ________________________   _________  Scl-70 (antiDNA          Scleroderma (diffuse)      20 - 35%  topoisomerase)           Crest                           13%  _____________________   ________________________   _________  Maria Eugenia-1                     Polymyositis and/or                          Dermatomyositis            20 - 40%  _____________________   ________________________   _________  Anastacio Lazo B             Scleroderma -                            Crest                          variant                         80%  _____________________   ________________________   _________  Ribosomal P              SLE                        10 - 20%  Performed At: [facility]  [address] [id number]  Rosy Chauhan MD [id number]      Myeloperoxidase (MPO) Ab 06/10/2019 <9.0  0.0 - 9.0 U/mL Final    Proteinase 3 (KS-3) Ab 06/10/2019 <3.5  0.0 - 3.5 U/mL Final    Comment: (NOTE)  Performed At: [address] [id number]  Rosy Chauhan MD D      Cytoplasmic (C-ANCA) Ab 06/10/2019 <1:20  Neg:<1:20 titer Final    Perinuclear (P-ANCA) 06/10/2019 <1:20  Neg:<1:20 titer Final    Comment: (NOTE)  The presence of positive fluorescence exhibiting P-ANCA or C-ANCA  patterns alone is not specific for the diagnosis of Wegener's  Granulomatosis (WG) or microscopic polyangiitis. Decisions about  treatment should not be based solely on ANCA IFA results. The  International ANCA Group Consensus recommends follow up testing of  positive sera with both GA-3 and MPO-ANCA enzyme immunoassays. As  many as 5% serum samples are positive only by EIA. Ref. AM J Clin Pathol 1999;111:507-513.  Atypical pANCA 06/10/2019 <1:20  Neg:<1:20 titer Final    Comment: (NOTE)  The atypical pANCA pattern has been observed in a significant  percentage of patients with ulcerative colitis, primary sclerosing  cholangitis and autoimmune hepatitis. Performed At: [address] [id number]  Bartolo Bowen MD O      Rheumatoid factor 06/10/2019 14.0* 0.0 - 13.9 IU/mL Final    Comment: (NOTE)  Performed At: [address] [id number]  Bartolo Bowen MD [id number]      Sed rate, automated 06/10/2019 14  0 - 30 mm/hr Final    Angiotensin Converting Enzyme (ACE) 06/10/2019 57  14 - 82 U/L Final    Comment: (NOTE)  Performed At: [address] [id number]  Bartolo Bowen MD SX:[id number]      C-Reactive protein 06/10/2019 <0.3  0 - 0.3 mg/dL Final    CK 06/10/2019 50  26 - 192 U/L Final    A. fumigatus #1 Ab 06/10/2019 NEGATIVE   NEGATIVE   Final    Micropoly. faeni Abs 06/10/2019 NEGATIVE   NEGATIVE   Final    T. vulgaris #1 Ab 06/10/2019 NEGATIVE   NEGATIVE   Final    A. pullulans Abs 06/10/2019 NEGATIVE   NEGATIVE   Final    T. saccharii Ab 06/10/2019 NEGATIVE   NEGATIVE   Final    [place] Serum Abs 06/10/2019 NEGATIVE   NEGATIVE   Final    Comment: (NOTE)  Performed At: [address] [id number]  Bartolo Bowen MD JS:[id number]      50. 3.3 ETA, Rheum.  Arthritis 06/10/2019 <0.20  ng/mL Final    Comment: (NOTE)  Reference Range:  < 0.2 ng/mL  COMMENTS:  - This RheumAssure panel is comprised of three tests: RA  Factor, CCP Antibodies, and 14-3-3 eta protein. - Used together, these three markers are able to diagnose  established RA with a sensitivity of 88-96% and early RA  with a sensitivity of 78-92%(1,2).  - RA factor (also called RF) is elevated in established RA  (sensitivity 72-85%)(3,2) and in early RA (sensitivity  44-63%)(4,1). Its specificity for RA is 80%(3). - CCP Antibodies have similar sensitivities for  established RA (66-79%) and early RA (59%)(3,2) but  higher specificity (90-96%)(3,5). - Serum 14-3-3 eta protein is elevated in both established  RA (sensitivity 77%) and early RA (sensitivity 59-64%)  (1,2). It may provide a 15-20% incremental benefit in  identifying early RA (4,1). - Therefore, elevation of one or more markers of  RheumAssure is consistent with a diagnosis of rheumatoid  arthritis. When all three markers are negative, an RA  diagnosis is less likely. References:  1. Jojo TAYLOR et al.J Rheumatol 2015;42(9):1973-2126.  2. Jojo TAYLOR et al. [facility] Rheumatol 9127;84(74):6-60. 3. Vianney HODGES et al. Evelina [facility] Rheum Dis 2003;62:870-874.  4. Andres N, et al. Arthritis Res Ther 2016;18:37.  5. Marce S et al. [facility] Rheum Dis 4673;93:042-005.   Performed At: [address] [address] [de-identified]  Stevie Carvalho MD QY:[id number]      CCP Antibodies IgG/IgA 06/10/2019 <20  Units Final    Comment: (NOTE)  Reference Range:  < 20        Negative  20 - 39     Weak Positive  40 - 59     Moderate Positive  > or = 60   Strong Positive  Performed At: [address] [de-identified]  Stevie Carvalho MD BX:[id number]      Rheumatoid Arthritis Factor 06/10/2019 <14.0  Units/mL Final    Comment: (NOTE)  Reference Range:  <14.0          Negative  > or = 14.0    Positive  Performed At: [address] [de-identified]  Stevie Carvalho MD FO:[id number]      Aldolase 06/10/2019 3.4  3.3 - 10.3 U/L Final    Comment: (NOTE)  Performed At: [address] [id number]  Diana Gonzales MD [id number]      QuantiFERON Criteria 06/10/2019 Comment    Final    Comment: (NOTE)  The QuantiFERON-TB Gold Plus result is determined by subtracting  the Nil value from either TB antigen (Ag) tube. The mitogen tube  serves as a control for the test.      QuantiFERON TB1 Ag 06/10/2019 0.05  IU/mL Final    QuantiFERON TB2 Ag 06/10/2019 0.02  IU/mL Final    QuantiFERON Nil Value 06/10/2019 0.02  IU/mL Final    QuantiFERON Mitogen Value 06/10/2019 >10.00  IU/mL Final    QuantiFERON Plus 06/10/2019 NEGATIVE   NEGATIVE   Final    Comment: (NOTE)  The specimen received for QuantiFERON testing was incubated by the  ordering institution. Specific procedures outlined in our  Directory of Services and in the package insert for the QuantiFERON  Gold (In Tube) test must be followed to enable for proper stimulation  of cells for the production of interferon gamma. Performed At: [address] [id number]  Diana Gonzales MD [id number]         . No results found for any visits on 07/09/19. Assessment / Plan      ICD-10-CM ICD-9-CM    1. Anxiety F41.9 300.00    2. Encephalopathy G93.40 348.30        she was advised to continue her maintenance medications      Follow-up and Dispositions    · Return in about 2 months (around 9/9/2019). I asked the patient and her  for any questions and I answered their questions. They state that they understand the treatment plan and agree with the treatment plan      THIS DOCUMENT WAS CREATED WITH A VOICE ACTIVATED DICTATION SYSTEM.   IT MAY CONTAIN TRANSCRIPTION ERRORS

## 2019-07-18 DIAGNOSIS — M79.7 PRIMARY FIBROMYALGIA SYNDROME: Primary | ICD-10-CM

## 2019-07-18 RX ORDER — GABAPENTIN 100 MG/1
CAPSULE ORAL
Qty: 90 CAP | Refills: 3 | Status: SHIPPED | OUTPATIENT
Start: 2019-07-18 | End: 2019-07-22 | Stop reason: SDUPTHER

## 2019-07-18 NOTE — TELEPHONE ENCOUNTER
Requested Prescriptions     Pending Prescriptions Disp Refills    gabapentin (NEURONTIN) 100 mg capsule 90 Cap 3     Si caps three times per day

## 2019-07-22 DIAGNOSIS — M79.7 PRIMARY FIBROMYALGIA SYNDROME: ICD-10-CM

## 2019-07-22 RX ORDER — GABAPENTIN 100 MG/1
CAPSULE ORAL
Qty: 540 CAP | Refills: 1 | Status: SHIPPED | OUTPATIENT
Start: 2019-07-22

## 2019-07-22 NOTE — TELEPHONE ENCOUNTER
Controlled Substance Refill Request Information    Medication: gabapentin 100 mg capsule    Last fill on : #540 on 3/1/2019 (90-day supply)     Last OV: 7/9/2019    Next appointment: 10/29/2019    Last UDS: none on file    Other Controlled Substances: No (for the past year)    Controlled Substance Agreement on File: No (document in media listed as CSA is incorrectly labeled)

## 2019-10-29 ENCOUNTER — OFFICE VISIT (OUTPATIENT)
Dept: FAMILY MEDICINE CLINIC | Facility: CLINIC | Age: 74
End: 2019-10-29

## 2019-10-29 VITALS
WEIGHT: 177 LBS | BODY MASS INDEX: 29.49 KG/M2 | RESPIRATION RATE: 16 BRPM | SYSTOLIC BLOOD PRESSURE: 145 MMHG | DIASTOLIC BLOOD PRESSURE: 85 MMHG | HEART RATE: 87 BPM | OXYGEN SATURATION: 95 % | TEMPERATURE: 98.4 F | HEIGHT: 65 IN

## 2019-10-29 DIAGNOSIS — Z23 ENCOUNTER FOR IMMUNIZATION: ICD-10-CM

## 2019-10-29 DIAGNOSIS — G93.40 ENCEPHALOPATHY: ICD-10-CM

## 2019-10-29 DIAGNOSIS — F41.9 ANXIETY: Primary | ICD-10-CM

## 2019-10-29 DIAGNOSIS — M79.7 PRIMARY FIBROMYALGIA SYNDROME: ICD-10-CM

## 2019-10-31 NOTE — PROGRESS NOTES
The patient presents to the office today with the chief complaint of anxiety    HPI    The patient remains on Seroquel and Zoloft for chronic anxiety. The patient has done well on these medications. The patient is \"not quite as good\" as she has been at her best but overall is doing \"okay\" the patient's  also sees some ups and downs in the patient's anxiety and mental status. The patient suffers from a chronic encephalopathy which is doing well on the Zoloft, Seroquel, with the addition of Namenda. The patient has chronic fatigue and myalgias secondary to fibromyalgia. The patient remains on Namenda for this with a fair response. ROS  Positive for fatigue, myalgias, and anxiety as above  Negative for chest pain or shortness of breath    Allergies   Allergen Reactions    Seafood Anaphylaxis    Antihistimine Swelling    Iodine Other (comments)     Wheezing and choking         Current Outpatient Medications   Medication Sig Dispense Refill    gabapentin (NEURONTIN) 100 mg capsule Take two (2) caps by mouth three times per day. 540 Cap 1    QUEtiapine (SEROQUEL) 50 mg tablet TAKE 1 TABLET BY MOUTH AT BEDTIME 90 Tab 0    sertraline (ZOLOFT) 50 mg tablet TAKE 2 TABLETS BY MOUTH EVERY  Tab 0    memantine (NAMENDA) 10 mg tablet 1 tab twice per day 180 Tab 3       Past Medical History:   Diagnosis Date    Acute upper respiratory infection     Anxiety disorder     Arthritis     Costal chondritis     Dyslipidemia     Dyspnea     Fibromyalgia     Hypotension     Interstitial lung disease (HCC)     Jaw pain     Mitral valve prolapse     pt denies this    Pernicious anemia     Primary fibromyalgia syndrome     Pulmonary fibrosis (HCC)     Pure hypercholesterolemia     Quadricuspid aortic valve ??      ECHO 7/14    Rheumatoid arthritis(714.0)     Visceral leishmaniasis        Past Surgical History:   Procedure Laterality Date    HX CATARACT REMOVAL Bilateral     HX HEART CATHETERIZATION  2004    normal    HX PARTIAL HYSTERECTOMY      HX TONSILLECTOMY         Social History     Socioeconomic History    Marital status:      Spouse name: Not on file    Number of children: Not on file    Years of education: Not on file    Highest education level: Not on file   Occupational History    Not on file   Social Needs    Financial resource strain: Not on file    Food insecurity:     Worry: Not on file     Inability: Not on file    Transportation needs:     Medical: Not on file     Non-medical: Not on file   Tobacco Use    Smoking status: Former Smoker     Packs/day: 1.00     Years: 25.00     Pack years: 25.00     Last attempt to quit: 4/15/1975     Years since quittin.5    Smokeless tobacco: Never Used   Substance and Sexual Activity    Alcohol use: Not Currently     Alcohol/week: 0.0 standard drinks     Comment: occasional    Drug use: No    Sexual activity: Yes     Partners: Male   Lifestyle    Physical activity:     Days per week: Not on file     Minutes per session: Not on file    Stress: Not on file   Relationships    Social connections:     Talks on phone: Not on file     Gets together: Not on file     Attends Latter day service: Not on file     Active member of club or organization: Not on file     Attends meetings of clubs or organizations: Not on file     Relationship status: Not on file    Intimate partner violence:     Fear of current or ex partner: Not on file     Emotionally abused: Not on file     Physically abused: Not on file     Forced sexual activity: Not on file   Other Topics Concern    Not on file   Social History Narrative    Not on file       Patient does not have an advanced directive on file    Visit Vitals  /85 (BP 1 Location: Right arm, BP Patient Position: Sitting)   Pulse 87   Temp 98.4 °F (36.9 °C) (Oral)   Resp 16   Ht 5' 5\" (1.651 m)   Wt 177 lb (80.3 kg)   SpO2 95%   BMI 29.45 kg/m²       Physical Exam  No Cervical Lymphadenopathy  No Supraclavicular Lymphadenopathy  Thyroid is Normal  Lungs are normal to percussion. Clear to auscultation   Heart:  S1 S2 are normal, No gallops, No murmurs  No Carotid Bruits  Abdomen:  Normal Bowel Sounds. No tenderness. No masses. No Hepatomegaly or Splenomegaly  LE:  Strong Pedal Pulses. No Edema      BMI: The BMI is high. It was not addressed during his visit secondary to presence of encephalopathy. No visits with results within 3 Month(s) from this visit.    Latest known visit with results is:   Hospital Outpatient Visit on 06/10/2019   Component Date Value Ref Range Status    Anti-DNA (DS) Ab, QT 06/10/2019 <1  0 - 9 IU/mL Final    Comment: (NOTE)                                    Negative      <5                                    Equivocal  5 - 9                                    Positive      >9      RNP Abs 06/10/2019 <0.2  0.0 - 0.9 AI Final    Smith Abs 06/10/2019 <0.2  0.0 - 0.9 AI Final    Vizcaino/RNP Abs 06/10/2019 <0.2  0.0 - 0.9 AI Final    Scleroderma-70 Ab 06/10/2019 <0.2  0.0 - 0.9 AI Final    Sjogren's Anti-SS-A 06/10/2019 <0.2  0.0 - 0.9 AI Final    Sjogren's Anti-SS-B 06/10/2019 <0.2  0.0 - 0.9 AI Final    Antichromatin Ab 06/10/2019 <0.2  0.0 - 0.9 AI Final    Anti Ribosomal P Ab 06/10/2019 <0.2  0.0 - 0.9 AI Final    Anti-Maria Eugenia-1 06/10/2019 <0.2  0.0 - 0.9 AI Final    Centromere B Ab 06/10/2019 <0.2  0.0 - 0.9 AI Final    Comment: (NOTE)  Performed At: [address] [id number]  Ajay Driscoll MD SOLIS:[id number]     [address] [place] [address] See below 06/10/2019 Comment    Final    Comment: (NOTE)  Autoantibody                       Disease Association  ____________________________________________________________                         Condition                  Frequency  _____________________   ________________________   _________  Antinuclear Antibody,    SLE, mixed connective  Direct (WALESKA-D)           tissue diseases  _____________________   ________________________   _________  dsDNA                    SLE                        40 - 60%  _____________________   ________________________   _________  Chromatin                Drug induced SLE                90%                          SLE                        48 - 97%  _____________________   ________________________   _________  Thania Horsfall (Ro)                 SLE                        25 - 35%                          Sjogren's Syndrome         40 - 70%                           Lupus                 100%  _____________________   ________________________   _________  SSB (La)                 SLE                                                        10%                          Sjogren's Syndrome              30%  _____________________   _______________________    _________  Sm (anti-Smith)          SLE                        15 - 30%  _____________________   _______________________    _________  RNP                      Mixed Connective Tissue                          Disease                         95%  (U1 nRNP,                SLE                        30 - 50%  anti-ribonucleoprotein)  Polymyositis and/or                          Dermatomyositis                 20%  _____________________   ________________________   _________  Scl-70 (antiDNA          Scleroderma (diffuse)      20 - 35%  topoisomerase)           Crest                           13%  _____________________   ________________________   _________  Maria Eugenia-1                     Polymyositis and/or                          Dermatomyositis            20 - 40%  _____________________   ________________________   _________  Ethelle Learn B             Scleroderma -                            Crest                          variant                         80%  _____________________   ________________________   _________  Ribosomal P              SLE                        10 - 20%  [facility]formed At: [facility] [address] [id number]  Shayy Palacios MD UL:[id number]      Myeloperoxidase (MPO) Ab 06/10/2019 <9.0  0.0 - 9.0 U/mL Final    Proteinase 3 (NC-3) Ab 06/10/2019 <3.5  0.0 - 3.5 U/mL Final    Comment: (NOTE)  [facility]formed At: [facility] [address] [id number]  Shayy Palacios MD DC:[id number]      Cytoplasmic (C-ANCA) Ab 06/10/2019 <1:20  Neg:<1:20 titer Final    Perinuclear (P-ANCA) 06/10/2019 <1:20  Neg:<1:20 titer Final    Comment: (NOTE)  The presence of positive fluorescence exhibiting P-ANCA or C-ANCA  patterns alone is not specific for the diagnosis of Wegener's  Granulomatosis (WG) or microscopic polyangiitis. Decisions about  treatment should not be based solely on ANCA IFA results. The  International ANCA Group Consensus recommends follow up testing of  positive sera with both NC-3 and MPO-ANCA enzyme immunoassays. As  many as 5% serum samples are positive only by EIA. Ref. AM J Clin Pathol 1999;111:507-513.  Atypical pANCA 06/10/2019 <1:20  Neg:<1:20 titer Final    Comment: (NOTE)  The atypical pANCA pattern has been observed in a significant  percentage of patients with ulcerative colitis, primary sclerosing  cholangitis and autoimmune hepatitis.   Performed At: [facility]  [place] [address] [id number]  Shayy Palacios MD CY:[id number]      Rheumatoid factor 06/10/2019 14.0* 0.0 - 13.9 IU/mL Final    Comment: (NOTE)  Performed At: [facility]  [place] [address] [id number]  Shayy Palacios MD ZH:[id number]      Sed rate, automated 06/10/2019 14  0 - 30 mm/hr Final    Angiotensin Converting Enzyme (ACE) 06/10/2019 57  14 - 82 U/L Final    Comment: (NOTE)  Performed At: [facility] [address] [id number]  Shayy Palacios MD OR:[id number]      C-Reactive protein 06/10/2019 <0.3  0 - 0.3 mg/dL Final    CK 06/10/2019 50  26 - 192 U/L Final    A. fumigatus #1 Ab 06/10/2019 NEGATIVE   NEGATIVE   Final    Micropoly. faeni Abs 06/10/2019 NEGATIVE   NEGATIVE   Final    T. vulgaris #1 Ab 06/10/2019 NEGATIVE   NEGATIVE   Final    A. pullulans Abs 06/10/2019 NEGATIVE   NEGATIVE   Final    T. saccharii Ab 06/10/2019 NEGATIVE   NEGATIVE   Final    Chamberino Serum Abs 06/10/2019 NEGATIVE   NEGATIVE   Final    Comment: (NOTE)  Performed At: [address], [address] [place] [id number]  Michelle Vick MD LP:[id number]      02. 3.3 ETA, Rheum. Arthritis 06/10/2019 <0.20  ng/mL Final    Comment: (NOTE)  Reference Range:  < 0.2 ng/mL  COMMENTS:  - This RheumAssure panel is comprised of three tests: RA  Factor, CCP Antibodies, and 14-3-3 eta protein. - Used together, these three markers are able to diagnose  established RA with a sensitivity of 88-96% and early RA  with a sensitivity of 78-92%(1,2).  - RA factor (also called RF) is elevated in established RA  (sensitivity 72-85%)(3,2) and in early RA (sensitivity  44-63%)(4,1). Its specificity for RA is 80%(3). - CCP Antibodies have similar sensitivities for  established RA (66-79%) and early RA (59%)(3,2) but  higher specificity (90-96%)(3,5). - Serum 14-3-3 eta protein is elevated in both established  RA (sensitivity 77%) and early RA (sensitivity 59-64%)  (1,2). It may provide a 15-20% incremental benefit in  identifying early RA (4,1). - Therefore, elevation of one or more markers of  RheumAssure is consistent with a diagnosis of rheumatoid  arthritis. When all three markers are negative, an RA  diagnosis is less likely. References:  1. Jojo TAYLOR et al.J Rheumatol 2015;42(9):5052-4542.  2. Jojo TAYLOR et al. Lauren Gaines Rheumatol 4612;16(11):0-45. 3. Eleno HODGES et al. Matthiasen Lacy Rheum Dis 2003;62:870-874.  4. Andres OSBORN, et al. Arthritis Res Ther 2016;18:37.  5. Marce S et al. Ardecolby Matthewy Rheum Dis 9033;68:566-446.   Performed At: [address] [address] [id number]  Ashley Perez MD EK:[id number]      CCP Antibodies IgG/IgA 06/10/2019 <20  Units Final    Comment: (NOTE)  Reference Range:  < 20        Negative  20 - 39     Weak Positive  40 - 59     Moderate Positive  > or = 60   Strong Positive  Performed At: [address] [de-identified]  Ashley Perez MD FZ:[id number]      Rheumatoid Arthritis Factor 06/10/2019 <14.0  Units/mL Final    Comment: (NOTE)  Reference Range:  <14.0          Negative  > or = 14.0    Positive  Performed At: [address] [de-identified]  Ashley Perez MD OH:[id number]      Aldolase 06/10/2019 3.4  3.3 - 10.3 U/L Final    Comment: (NOTE)  Performed At: [address] [id number]  Michelle Vick MD WP:[id number]      QuantiFERON Criteria 06/10/2019 Comment    Final    Comment: (NOTE)  The QuantiFERON-TB Gold Plus result is determined by subtracting  the Nil value from either TB antigen (Ag) tube. The mitogen tube  serves as a control for the test.      QuantiFERON TB1 Ag 06/10/2019 0.05  IU/mL Final    QuantiFERON TB2 Ag 06/10/2019 0.02  IU/mL Final    QuantiFERON Nil Value 06/10/2019 0.02  IU/mL Final    QuantiFERON Mitogen Value 06/10/2019 >10.00  IU/mL Final    QuantiFERON Plus 06/10/2019 NEGATIVE   NEGATIVE   Final    Comment: (NOTE)  The specimen received for QuantiFERON testing was incubated by the  ordering institution. Specific procedures outlined in our  Directory of Services and in the package insert for the QuantiFERON  Gold (In Tube) test must be followed to enable for proper stimulation  of cells for the production of interferon gamma. Performed At: [address] [id number]  Michelle Vick MD XZ:[id number]         . No results found for any visits on 10/29/19. Assessment / Plan      ICD-10-CM ICD-9-CM    1. Anxiety F41.9 300.00    2.  Encounter for immunization Z23 V03.89 INFLUENZA VACCINE INACTIVATED (IIV), SUBUNIT, ADJUVANTED, IM      ADMIN INFLUENZA VIRUS VAC   3. Encephalopathy G93.40 348.30    4. Primary fibromyalgia syndrome M79.7 729.1        she was advised to continue her maintenance medications  Flu shot given    Follow-up and Dispositions    · Return in about 7 weeks (around 12/18/2019). I asked Wiley Guzman if she has any questions and I answered the questions. Beena Guzman states that she understands the treatment plan and agrees with the treatment plan          THIS DOCUMENT  [facility] [address].   IT MAY CONTAIN TRANSCRIPTION ERRORS

## 2019-11-06 RX ORDER — QUETIAPINE FUMARATE 50 MG/1
TABLET, FILM COATED ORAL
Qty: 90 TAB | Refills: 0 | Status: SHIPPED | OUTPATIENT
Start: 2019-11-06

## 2019-12-17 ENCOUNTER — OFFICE VISIT (OUTPATIENT)
Dept: FAMILY MEDICINE CLINIC | Facility: CLINIC | Age: 74
End: 2019-12-17

## 2019-12-17 VITALS
RESPIRATION RATE: 12 BRPM | OXYGEN SATURATION: 99 % | BODY MASS INDEX: 29.32 KG/M2 | SYSTOLIC BLOOD PRESSURE: 120 MMHG | DIASTOLIC BLOOD PRESSURE: 68 MMHG | WEIGHT: 176 LBS | HEART RATE: 104 BPM | TEMPERATURE: 98.8 F | HEIGHT: 65 IN

## 2019-12-17 DIAGNOSIS — F41.9 ANXIETY: Primary | ICD-10-CM

## 2019-12-17 DIAGNOSIS — M79.7 PRIMARY FIBROMYALGIA SYNDROME: ICD-10-CM

## 2019-12-17 NOTE — PROGRESS NOTES
Arya Garnica presents today for   Chief Complaint   Patient presents with    Well Woman       Arya Garnica preferred language for health care discussion is english. Is someone accompanying this pt? no    Is the patient using any DME equipment during [address]? no    Depression Screening:  3 most recent PHQ Screens 6/10/2019   PHQ Not Done -   Little interest or pleasure in doing things Not at all   Feeling down, depressed, irritable, or hopeless Not at all   Total Score PHQ 2 0       Learning Assessment:  Learning Assessment 9/26/2016   PRIMARY LEARNER Patient   PRIMARY LANGUAGE ENGLISH   LEARNER PREFERENCE PRIMARY DEMONSTRATION   ANSWERED BY Patient   RELATIONSHIP SELF       Abuse Screening:  Abuse Screening Questionnaire 6/10/2019   Do you ever feel afraid of your partner? N   Are you in a relationship with someone who physically or mentally threatens you? N   Is it safe for you to go home? Y       Fall Risk  Fall Risk Assessment, last 12 mths 10/29/2019   Able to walk? Yes   Fall in past 12 months? No   Fall with injury? -   Number of falls in past 12 months -   Fall Risk Score -       Health Maintenance reviewed and discussed and ordered per Provider. Health Maintenance Due   Topic Date Due    Hepatitis C Screening  1945    COLONOSCOPY  04/18/1963    Shingrix Vaccine Age 50> (1 of 2) 04/18/1995    GLAUCOMA SCREENING Q2Y  02/28/2019    MEDICARE YEARLY EXAM  04/19/2019   . Arya Garnica is updated on all     Pt currently taking Antiplatelet therapy? no    Coordination of Care:  1. Have you been to the ER, urgent care clinic since your last visit? no Hospitalized since your last visit? no    2. Have you seen or consulted any other health care providers outside of the [address] [place] [address] since your last visit? no Include any pap smears or colon screening.  no

## 2019-12-22 NOTE — PROGRESS NOTES
The patient presents to the office today with the chief complaint of anxiety    HPI    The patient remains on Zoloft and Seroquel for chronic anxiety. The patient is doing well on these medications. Patient also complains of memory loss. The patient is also on Namenda. This appears to help the patient's memory. The patient currently feels \"close to normal\" the patient has fibromyalgia. The patient often feels fatigued and has myalgias. The patient remains on gabapentin for this and is doing well on this medication. ROS  Never chest pain, dyspnea, or lower extremity edema    Allergies   Allergen Reactions    Seafood Anaphylaxis    Antihistimine Swelling    Iodine Other (comments)     Wheezing and choking         Current Outpatient Medications   Medication Sig Dispense Refill    QUEtiapine (SEROQUEL) 50 mg tablet TAKE 1 TABLET BY MOUTH AT BEDTIME 90 Tab 0    gabapentin (NEURONTIN) 100 mg capsule Take two (2) caps by mouth three times per day. 540 Cap 1    sertraline (ZOLOFT) 50 mg tablet TAKE 2 TABLETS BY MOUTH EVERY  Tab 0    memantine (NAMENDA) 10 mg tablet 1 tab twice per day 180 Tab 3       Past Medical History:   Diagnosis Date    Acute upper respiratory infection     Anxiety disorder     Arthritis     Costal chondritis     Dyslipidemia     Dyspnea     Fibromyalgia     Hypotension     Interstitial lung disease (HCC)     Jaw pain     Mitral valve prolapse     pt denies this    Pernicious anemia     Primary fibromyalgia syndrome     Pulmonary fibrosis (HCC)     Pure hypercholesterolemia     Quadricuspid aortic valve ??      ECHO 7/14    Rheumatoid arthritis(714.0)     Visceral leishmaniasis        Past Surgical History:   Procedure Laterality Date    HX CATARACT REMOVAL Bilateral     HX HEART CATHETERIZATION  2004    normal    HX PARTIAL HYSTERECTOMY      HX TONSILLECTOMY         Social History     Socioeconomic History    Marital status:      Spouse name: Not on file    Number of children: Not on file    Years of education: Not on file    Highest education level: Not on file   Occupational History    Not on file   Social Needs    Financial resource strain: Not on file    Food insecurity:     Worry: Not on file     Inability: Not on file    Transportation needs:     Medical: Not on file     Non-medical: Not on file   Tobacco Use    Smoking status: Former Smoker     Packs/day: 1.00     Years: 25.00     Pack years: 25.00     Last attempt to quit: 4/15/1975     Years since quittin.7    Smokeless tobacco: Never Used   Substance and Sexual Activity    Alcohol use: Not Currently     Alcohol/week: 0.0 standard drinks     Comment: occasional    Drug use: No    Sexual activity: Yes     Partners: Male   Lifestyle    Physical activity:     Days per week: Not on file     Minutes per session: Not on file    Stress: Not on file   Relationships    Social connections:     Talks on phone: Not on file     Gets together: Not on file     Attends Islam service: Not on file     Active member of club or organization: Not on file     Attends meetings of clubs or organizations: Not on file     Relationship status: Not on file    Intimate partner violence:     Fear of current or ex partner: Not on file     Emotionally abused: Not on file     Physically abused: Not on file     Forced sexual activity: Not on file   Other Topics Concern    Not on file   Social History Narrative    Not on file       Patient does not have an advanced directive on file    Visit Vitals  /68 (BP 1 Location: Right arm, BP Patient Position: Sitting)   Pulse (!) 104   Temp 98.8 °F (37.1 °C) (Tympanic)   Resp 12   Ht 5' 5\" (1.651 m)   Wt 176 lb (79.8 kg)   SpO2 99%   BMI 29.29 kg/m²       Physical Exam  No Cervical Lymphadenopathy  No Supraclavicular Lymphadenopathy  Thyroid is Normal  Lungs are normal to percussion.   Clear to auscultation   Heart:  S1 S2 are normal, No gallops, No murmurs  No Carotid Bruits  Abdomen:  Normal Bowel Sounds. No tenderness. No masses. No Hepatomegaly or Splenomegaly  LE:  Strong Pedal Pulses. No Edema        No visits with results within 3 Month(s) from this visit.    Latest known visit with results is:   Hospital Outpatient Visit on 06/10/2019   Component Date Value Ref Range Status    Anti-DNA (DS) Ab, QT 06/10/2019 <1  0 - 9 IU/mL Final    Comment: (NOTE)                                    Negative      <5                                    Equivocal  5 - 9                                    Positive      >9      RNP Abs 06/10/2019 <0.2  0.0 - 0.9 AI Final    Smith Abs 06/10/2019 <0.2  0.0 - 0.9 AI Final    Vizcaino/RNP Abs 06/10/2019 <0.2  0.0 - 0.9 AI Final    Scleroderma-70 Ab 06/10/2019 <0.2  0.0 - 0.9 AI Final    Sjogren's Anti-SS-A 06/10/2019 <0.2  0.0 - 0.9 AI Final    Sjogren's Anti-SS-B 06/10/2019 <0.2  0.0 - 0.9 AI Final    Antichromatin Ab 06/10/2019 <0.2  0.0 - 0.9 AI Final    Anti Ribosomal P Ab 06/10/2019 <0.2  0.0 - 0.9 AI Final    Anti-Maria Eugenia-1 06/10/2019 <0.2  0.0 - 0.9 AI Final    Centromere B Ab 06/10/2019 <0.2  0.0 - 0.9 AI Final    Comment: (NOTE)  Performed At: [address] [id number]  Ravindra Browne MD NA:[id number]     [address] [place] [address] See below 06/10/2019 Comment    Final    Comment: (NOTE)  Autoantibody                       Disease Association  ____________________________________________________________                         Condition                  Frequency  _____________________   ________________________   _________  Antinuclear Antibody,    SLE, mixed connective  Direct (WALESKA-D)           tissue diseases  _____________________   ________________________   _________  dsDNA                    SLE                        40 - 60%  _____________________   ________________________   _________  Chromatin                Drug induced SLE                90%                          SLE 48 - 97%  _____________________   ________________________   _________  Ashley Screen (Ro)                 SLE                        25 - 35%                          Sjogren's Syndrome         40 - 70%                           Lupus                 100%  _____________________   ________________________   _________  SSB (La)                 SLE                                                        10%                          Sjogren's Syndrome              30%  _____________________   _______________________    _________  Sm (anti-Smith)          SLE                        15 - 30%  _____________________   _______________________    _________  RNP                      Mixed Connective Tissue                          Disease                         95%  (U1 nRNP,                SLE                        30 - 50%  anti-ribonucleoprotein)  Polymyositis and/or                          Dermatomyositis                 20%  _____________________   ________________________   _________  Scl-70 (antiDNA          Scleroderma (diffuse)      20 - 35%  topoisomerase)           Crest                           13%  _____________________   ________________________   _________  Maria Eugenia-1                     Polymyositis and/or                          Dermatomyositis            20 - 40%  _____________________   ________________________   _________  Vertie Mage B             Scleroderma -                            Crest                          variant                         80%  _____________________   ________________________   _________  Ribosomal P              SLE                        10 - 20%  Performed At: [address] [id number]  Fabiola Hernandez MD V      Myeloperoxidase (MPO) Ab 06/10/2019 <9.0  0.0 - 9.0 U/mL Final    Proteinase 3 (SD-3) Ab 06/10/2019 <3.5  0.0 - 3.5 U/mL Final    Comment: (NOTE)  Performed At: [address] [id number]  Jeff Smith MD BY:[id number]      Cytoplasmic (C-ANCA) Ab 06/10/2019 <1:20  Neg:<1:20 titer Final    Perinuclear (P-ANCA) 06/10/2019 <1:20  Neg:<1:20 titer Final    Comment: (NOTE)  The presence of positive fluorescence exhibiting P-ANCA or C-ANCA  patterns alone is not specific for the diagnosis of Wegener's  Granulomatosis (WG) or microscopic polyangiitis. Decisions about  treatment should not be based solely on ANCA IFA results. The  International ANCA Group Consensus recommends follow up testing of  positive sera with both VT-3 and MPO-ANCA enzyme immunoassays. As  many as 5% serum samples are positive only by EIA. Ref. AM J Clin Pathol 1999;111:507-513.  Atypical pANCA 06/10/2019 <1:20  Neg:<1:20 titer Final    Comment: (NOTE)  The atypical pANCA pattern has been observed in a significant  percentage of patients with ulcerative colitis, primary sclerosing  cholangitis and autoimmune hepatitis. Performed At: [address] [id number]  Jeff Smith MD [id number]:[id number]      Rheumatoid factor 06/10/2019 14.0* 0.0 - 13.9 IU/mL Final    Comment: (NOTE)  Performed At: [address] [id number]  Jeff Smith MD [id number]      Sed rate, automated 06/10/2019 14  0 - 30 mm/hr Final    Angiotensin Converting Enzyme (ACE) 06/10/2019 57  14 - 82 U/L Final    Comment: (NOTE)  Performed At: [address] [id number]  Jeff Smith MD [id number]      C-Reactive protein 06/10/2019 <0.3  0 - 0.3 mg/dL Final    CK 06/10/2019 50  26 - 192 U/L Final    A. fumigatus #1 Ab 06/10/2019 NEGATIVE   NEGATIVE   Final    Micropoly.  faeni Abs 06/10/2019 NEGATIVE   NEGATIVE   Final    T. vulgaris #1 Ab 06/10/2019 NEGATIVE   NEGATIVE   Final    A. pullulans Abs 06/10/2019 NEGATIVE   NEGATIVE   Final    T. saccharii Ab 06/10/2019 NEGATIVE   NEGATIVE   Final    [place] Serum Abs 06/10/2019 NEGATIVE   NEGATIVE   Final    Comment: (NOTE)  Performed At: [facility]  [address] [place], [place] [id number]  Santos Flores MD [id number]      66. 3.3 ETA, Rheum. Arthritis 06/10/2019 <0.20  ng/mL Final    Comment: (NOTE)  Reference Range:  < 0.2 ng/mL  COMMENTS:  - This RheumAssure panel is comprised of three tests: RA  Factor, CCP Antibodies, and 14-3-3 eta protein. - Used together, these three markers are able to diagnose  established RA with a sensitivity of 88-96% and early RA  with a sensitivity of 78-92%(1,2).  - RA factor (also called RF) is elevated in established RA  (sensitivity 72-85%)(3,2) and in early RA (sensitivity  44-63%)(4,1). Its specificity for RA is 80%(3). - CCP Antibodies have similar sensitivities for  established RA (66-79%) and early RA (59%)(3,2) but  higher specificity (90-96%)(3,5). - Serum 14-3-3 eta protein is elevated in both established  RA (sensitivity 77%) and early RA (sensitivity 59-64%)  (1,2). It may provide a 15-20% incremental benefit in  identifying early RA (4,1). - Therefore, elevation of one or more markers of  RheumAssure is consistent with a diagnosis of rheumatoid  arthritis. When all three markers are negative, an RA  diagnosis is less likely. References:  1. Jojo TAYLOR et al.J Rheumatol 2015;42(9):1795-2876.  2. Jojo TAYLOR et al. Erlin Deem Rheumatol 7968;15(53):0-51. 3. Erica HODGES et al. [facility] [place] Rheum Dis 2003;62:870-874.  4. Carrier N, et al. Arthritis Res Ther 2016;18:37.  5. Marce BORREGO et al. [facility] Rhe[facility] 0891;71:907-867.   Performed At: [address] [address] [de-identified]  Alejandro Burgos MD VO:[id number]      CCP Antibodies IgG/IgA 06/10/2019 <20  Units Final    Comment: (NOTE)  Reference Range:  < 20        Negative  20 - 39     Weak Positive  40 - 59     Moderate Positive  > or = 60   Strong Positive  Performed At:  [employer]  [facility] [place], [place] [id number]  [address] MD RJ:[id number]      Rheumatoid Arthritis Factor 06/10/2019 <14.0  Units/mL Final    Comment: (NOTE)  Reference Range:  <14.0          Negative  > or = 14.0    Positive  Performed At: [address] [de-identified]  [address] MD [id number]      Aldolase 06/10/2019 3.4  3.3 - 10.3 U/L Final    Comment: (NOTE)  Performed At: [address] [id number]  Jorge Ly MD [id number]      QuantiFERON Criteria 06/10/2019 Comment    Final    Comment: (NOTE)  The QuantiFERON-TB Gold Plus result is determined by subtracting  the Nil value from either TB antigen (Ag) tube. The mitogen tube  serves as a control for the test.      QuantiFERON TB1 Ag 06/10/2019 0.05  IU/mL Final    QuantiFERON TB2 Ag 06/10/2019 0.02  IU/mL Final    QuantiFERON Nil Value 06/10/2019 0.02  IU/mL Final    QuantiFERON Mitogen Value 06/10/2019 >10.00  IU/mL Final    QuantiFERON Plus 06/10/2019 NEGATIVE   NEGATIVE   Final    Comment: (NOTE)  The specimen received for QuantiFERON testing was incubated by the  ordering institution. Specific procedures outlined in our  Directory of Services and in the package insert for the QuantiFERON  Gold (In Tube) test must be followed to enable for proper stimulation  of cells for the production of interferon gamma. Performed At: [address] [id number]  Jorge Ly MD [id number]:[id number]         . No results found for any visits on 12/17/19. Assessment / Plan      ICD-10-CM ICD-9-CM    1. Anxiety F41.9 300.00    2. Primary fibromyalgia syndrome M79.7 729.1        she was advised to continue her maintenance medications -I discussed the possibility of tapering and discontinuing some medications such as Abilify in the future. I advised the patient continue her current medications for now.       Follow-up and Dispositions    · Return in about 4 months (around 4/17/2020). I asked Haritha Guzman if she has any questions and I answered the questions. Beena Guzman states that she understands the treatment plan and agrees with the treatment plan          THIS DOCUMENT  [facility].   IT MAY CONTAIN TRANSCRIPTION ERRORS

## 2021-01-08 ENCOUNTER — TRANSCRIBE ORDER (OUTPATIENT)
Dept: SCHEDULING | Age: 76
End: 2021-01-08

## 2021-01-08 DIAGNOSIS — F03.90 SENILE DEMENTIA (HCC): Primary | ICD-10-CM

## 2021-01-14 ENCOUNTER — HOSPITAL ENCOUNTER (OUTPATIENT)
Dept: CT IMAGING | Age: 76
Discharge: HOME | End: 2021-01-14
Attending: FAMILY MEDICINE
Payer: MEDICARE

## 2021-01-14 DIAGNOSIS — F03.90 SENILE DEMENTIA (HCC): ICD-10-CM

## 2021-01-14 PROCEDURE — 70450 CT HEAD/BRAIN W/O DYE: CPT

## 2021-03-01 NOTE — ANESTHESIA POSTPROCEDURE EVALUATION
Patient called to reach her nurse. She stated her Losartan and metoprolol were increased. She is very dizzy since the increase and her left leg is numb and tingly as well,   She stated she is not herself and wants to get back to normal  Thank you   Post-Anesthesia Evaluation and Assessment    Patient: Diana George MRN: [id number]  SSN: [id number]    YOB: 1945  Age: 67 y.o. Sex: female     VS from flow sheet    Cardiovascular Function/Vital Signs  Visit Vitals    /66    Pulse 77    Temp 36.1 °C (97 °F)    Resp 16    Ht 5' 5\" (1.651 m)    Wt 59 kg (130 lb 2 oz)    SpO2 96%    BMI 21.65 kg/m2       Patient is status post MAC anesthesia for Procedure(s):  ENDOSCOPY. Nausea/Vomiting: None    Postoperative hydration reviewed and adequate. Pain:  Pain Scale 1: Numeric (0 - 10) (02/12/18 0908)  Pain Intensity 1: 0 (02/12/18 0908)   Managed    Neurological Status: At baseline    Mental Status and Level of Consciousness: Arousable    Pulmonary Status:   O2 Device: Room air (02/12/18 0908)   Adequate oxygenation and airway patent    Complications related to anesthesia: None    Post-anesthesia assessment completed.  No concerns    Signed By: Rochelle Nelson MD     February 12, 2018

## 2021-04-22 ENCOUNTER — HOSPITAL ENCOUNTER (OUTPATIENT)
Dept: LAB | Age: 76
Discharge: HOME | End: 2021-04-22
Payer: MEDICARE

## 2021-04-22 LAB
FOLATE SERPL-MCNC: >20 NG/ML (ref 3.1–17.5)
VIT B12 SERPL-MCNC: 1394 PG/ML (ref 211–911)

## 2021-04-22 PROCEDURE — 84425 ASSAY OF VITAMIN B-1: CPT

## 2021-04-22 PROCEDURE — 36415 COLL VENOUS BLD VENIPUNCTURE: CPT

## 2021-04-22 PROCEDURE — 82607 VITAMIN B-12: CPT

## 2021-04-22 PROCEDURE — 84446 ASSAY OF VITAMIN E: CPT

## 2021-04-22 PROCEDURE — 86592 SYPHILIS TEST NON-TREP QUAL: CPT

## 2021-04-23 LAB — RPR SER QL: NONREACTIVE

## 2021-04-27 ENCOUNTER — TRANSCRIBE ORDER (OUTPATIENT)
Dept: SCHEDULING | Age: 76
End: 2021-04-27

## 2021-04-27 DIAGNOSIS — F03.90 DEMENTIA (HCC): Primary | ICD-10-CM

## 2021-04-27 DIAGNOSIS — R41.0 CONFUSION: ICD-10-CM

## 2021-04-27 LAB
A-TOCOPHEROL VIT E SERPL-MCNC: ABNORMAL MG/L
VITAMIN E/ALPHA-TOCOPHEROL: 19.5 MG/L (ref 9–29)
VITAMIN E/GAMMA-TOCOPHEROL: 0.4 MG/L (ref 0.5–4.9)

## 2021-04-29 LAB — VIT B1 BLD-SCNC: 195.9 NMOL/L (ref 66.5–200)

## 2021-06-03 ENCOUNTER — HOSPITAL ENCOUNTER (OUTPATIENT)
Age: 76
Discharge: HOME | End: 2021-06-03
Attending: FAMILY MEDICINE
Payer: MEDICARE

## 2021-06-03 DIAGNOSIS — F03.90 DEMENTIA (HCC): ICD-10-CM

## 2021-06-03 PROCEDURE — 70551 MRI BRAIN STEM W/O DYE: CPT

## 2021-09-20 ENCOUNTER — HOSPITAL ENCOUNTER (OUTPATIENT)
Dept: LAB | Age: 76
Discharge: HOME | End: 2021-09-20
Payer: MEDICARE

## 2021-09-20 LAB
T4 FREE SERPL-MCNC: 0.7 NG/DL (ref 0.7–1.5)
TSH SERPL DL<=0.05 MIU/L-ACNC: 3.18 UIU/ML (ref 0.36–3.74)

## 2021-09-20 PROCEDURE — 84439 ASSAY OF FREE THYROXINE: CPT

## 2021-09-20 PROCEDURE — 36415 COLL VENOUS BLD VENIPUNCTURE: CPT

## 2022-01-03 ENCOUNTER — HOSPITAL ENCOUNTER (EMERGENCY)
Age: 77
Discharge: HOME | End: 2022-01-03
Attending: STUDENT IN AN ORGANIZED HEALTH CARE EDUCATION/TRAINING PROGRAM
Payer: MEDICARE

## 2022-01-03 VITALS
SYSTOLIC BLOOD PRESSURE: 124 MMHG | OXYGEN SATURATION: 97 % | TEMPERATURE: 97.5 F | RESPIRATION RATE: 17 BRPM | HEART RATE: 96 BPM | DIASTOLIC BLOOD PRESSURE: 95 MMHG

## 2022-01-03 DIAGNOSIS — R19.7 DIARRHEA, UNSPECIFIED TYPE: Primary | ICD-10-CM

## 2022-01-03 LAB
ALBUMIN SERPL-MCNC: 3.7 G/DL (ref 3.4–5)
ALBUMIN/GLOB SERPL: 1.2 {RATIO} (ref 0.8–1.7)
ALP SERPL-CCNC: 56 U/L (ref 45–117)
ALT SERPL-CCNC: 42 U/L (ref 13–56)
ANION GAP SERPL CALC-SCNC: 9 MMOL/L (ref 3–18)
AST SERPL-CCNC: 77 U/L (ref 10–38)
BASOPHILS # BLD: 0 K/UL (ref 0–0.1)
BASOPHILS NFR BLD: 0 % (ref 0–2)
BILIRUB SERPL-MCNC: 0.5 MG/DL (ref 0.2–1)
BUN SERPL-MCNC: 14 MG/DL (ref 7–18)
BUN/CREAT SERPL: 14 (ref 12–20)
CALCIUM SERPL-MCNC: 9 MG/DL (ref 8.5–10.1)
CHLORIDE SERPL-SCNC: 112 MMOL/L (ref 100–111)
CO2 SERPL-SCNC: 20 MMOL/L (ref 21–32)
CREAT SERPL-MCNC: 1 MG/DL (ref 0.6–1.3)
DIFFERENTIAL METHOD BLD: NORMAL
EOSINOPHIL # BLD: 0.1 K/UL (ref 0–0.4)
EOSINOPHIL NFR BLD: 1 % (ref 0–5)
ERYTHROCYTE [DISTWIDTH] IN BLOOD BY AUTOMATED COUNT: 14.2 % (ref 11.6–14.5)
GLOBULIN SER CALC-MCNC: 3.1 G/DL (ref 2–4)
GLUCOSE SERPL-MCNC: 103 MG/DL (ref 74–99)
HCT VFR BLD AUTO: 41.2 % (ref 35–45)
HGB BLD-MCNC: 13.7 G/DL (ref 12–16)
IMM GRANULOCYTES # BLD AUTO: 0 K/UL (ref 0–0.04)
IMM GRANULOCYTES NFR BLD AUTO: 0 % (ref 0–0.5)
LIPASE SERPL-CCNC: 143 U/L (ref 73–393)
LYMPHOCYTES # BLD: 2.5 K/UL (ref 0.9–3.6)
LYMPHOCYTES NFR BLD: 23 % (ref 21–52)
MCH RBC QN AUTO: 31 PG (ref 24–34)
MCHC RBC AUTO-ENTMCNC: 33.3 G/DL (ref 31–37)
MCV RBC AUTO: 93.2 FL (ref 78–100)
MONOCYTES # BLD: 0.8 K/UL (ref 0.05–1.2)
MONOCYTES NFR BLD: 7 % (ref 3–10)
NEUTS SEG # BLD: 7.2 K/UL (ref 1.8–8)
NEUTS SEG NFR BLD: 68 % (ref 40–73)
NRBC # BLD: 0 K/UL (ref 0–0.01)
NRBC BLD-RTO: 0 PER 100 WBC
PLATELET # BLD AUTO: 234 K/UL (ref 135–420)
PMV BLD AUTO: 9.8 FL (ref 9.2–11.8)
POTASSIUM SERPL-SCNC: 3.7 MMOL/L (ref 3.5–5.5)
PROT SERPL-MCNC: 6.8 G/DL (ref 6.4–8.2)
RBC # BLD AUTO: 4.42 M/UL (ref 4.2–5.3)
SODIUM SERPL-SCNC: 141 MMOL/L (ref 136–145)
WBC # BLD AUTO: 10.7 K/UL (ref 4.6–13.2)

## 2022-01-03 PROCEDURE — U0003 INFECTIOUS AGENT DETECTION BY NUCLEIC ACID (DNA OR RNA); SEVERE ACUTE RESPIRATORY SYNDROME CORONAVIRUS 2 (SARS-COV-2) (CORONAVIRUS DISEASE [COVID-19]), AMPLIFIED PROBE TECHNIQUE, MAKING USE OF HIGH THROUGHPUT TECHNOLOGIES AS DESCRIBED BY CMS-2020-01-R: HCPCS

## 2022-01-03 PROCEDURE — 99284 EMERGENCY DEPT VISIT MOD MDM: CPT

## 2022-01-03 PROCEDURE — 74011250636 HC RX REV CODE- 250/636: Performed by: EMERGENCY MEDICINE

## 2022-01-03 PROCEDURE — 85025 COMPLETE CBC W/AUTO DIFF WBC: CPT

## 2022-01-03 PROCEDURE — 80053 COMPREHEN METABOLIC PANEL: CPT

## 2022-01-03 PROCEDURE — 83690 ASSAY OF LIPASE: CPT

## 2022-01-03 RX ORDER — GABAPENTIN 100 MG/1
100 CAPSULE ORAL
Status: DISCONTINUED | OUTPATIENT
Start: 2022-01-03 | End: 2022-01-03

## 2022-01-03 RX ADMIN — SODIUM CHLORIDE 1000 ML: 900 INJECTION, SOLUTION INTRAVENOUS at 21:43

## 2022-01-04 LAB
SARS-COV-2, COV2: NORMAL
SARS-COV-2, NAA: NOT DETECTED

## 2022-01-04 NOTE — ED PROVIDER NOTES
EMERGENCY DEPARTMENT HISTORY AND PHYSICAL EXAM    Date: 1/3/2022  Patient Name: Maria Guadalupe Núñez    History of Presenting Illness     No chief complaint on file. History Provided By: Patient and Patient's     Additional History (Context): Maria Guadalupe Núñez is a 68 y.o. female with hypertension, hyperlipidemia and dementia who presents with diarrhea 2-3 weeks of diarrhea; put on cipro and oral flagyl on 12/29 by her PCP. Seems to be worsening. Not have a GI physician. Does not endorse abdominal pain. She told her  she had bright red blood with wiping only. Evaluated by her PCP for possible hemorrhoids but were none. PCP: Lucia Jackson MD    Current Facility-Administered Medications   Medication Dose Route Frequency Provider Last Rate Last Admin    QUEtiapine (SEROquel) tablet 150 mg  150 mg Oral NOW Sylwia Mariscal PA        gabapentin (NEURONTIN) capsule 100 mg  100 mg Oral NOW Sylwia Mariscal PA         Current Outpatient Medications   Medication Sig Dispense Refill    QUEtiapine (SEROQUEL) 50 mg tablet TAKE 1 TABLET BY MOUTH AT BEDTIME 90 Tab 0    gabapentin (NEURONTIN) 100 mg capsule Take two (2) caps by mouth three times per day. 540 Cap 1    sertraline (ZOLOFT) 50 mg tablet TAKE 2 TABLETS BY MOUTH EVERY  Tab 0    memantine (NAMENDA) 10 mg tablet 1 tab twice per day 180 Tab 3       Past History     Past Medical History:  Past Medical History:   Diagnosis Date    Acute upper respiratory infection     Anxiety disorder     Arthritis     Costal chondritis     Dyslipidemia     Dyspnea     Fibromyalgia     Hypotension     Interstitial lung disease (HCC)     Jaw pain     Mitral valve prolapse     pt denies this    Pernicious anemia     Primary fibromyalgia syndrome     Pulmonary fibrosis (HCC)     Pure hypercholesterolemia     Quadricuspid aortic valve ??      ECHO 7/14    Rheumatoid arthritis(714.0)     Visceral leishmaniasis        Past Surgical History:  Past Surgical History:   Procedure Laterality Date    HX CATARACT REMOVAL Bilateral     HX HEART CATHETERIZATION  2004    normal    HX PARTIAL HYSTERECTOMY      HX TONSILLECTOMY         Family History:  Family History   Problem Relation Age of Onset    Heart Attack Father         40,58    Heart Disease Father     Heart Disease Brother         History of stenting    Heart Attack Brother 61       Social History:  Social History     Tobacco Use    Smoking status: Former Smoker     Packs/day: 1.00     Years: 25.00     Pack years: 25.00     Quit date: 4/15/1975     Years since quittin.7    Smokeless tobacco: Never Used   Substance Use Topics    Alcohol use: Not Currently     Alcohol/week: 0.0 standard drinks     Comment: occasional    Drug use: No       Allergies: Allergies   Allergen Reactions    Seafood Anaphylaxis    Antihistimine Swelling    Iodine Other (comments)     Wheezing and choking           Review of Systems   Review of Systems   Constitutional: Negative for fever. HENT: Negative. Eyes: Negative. Respiratory: Negative. Cardiovascular: Negative. Gastrointestinal: Positive for anal bleeding and diarrhea. Negative for abdominal pain and nausea. Endocrine: Negative. Genitourinary: Negative. Musculoskeletal: Negative. Skin: Negative. Allergic/Immunologic: Negative. Neurological: Negative. Hematological: Does not bruise/bleed easily. All Other Systems Negative  Physical Exam     Vitals:    22 2105   BP: (!) 124/95   Pulse: 96   Resp: 17   Temp: 97.5 °F (36.4 °C)   SpO2: 97%     Physical Exam  Vitals and nursing note reviewed. Constitutional:       Appearance: She is well-developed. HENT:      Head: Normocephalic and atraumatic. Right Ear: External ear normal.      Left Ear: External ear normal.      Nose: Nose normal.   Eyes:      Conjunctiva/sclera: Conjunctivae normal.      Pupils: Pupils are equal, round, and reactive to light. Neck:      Vascular: No JVD. Trachea: No tracheal deviation. Cardiovascular:      Rate and Rhythm: Normal rate and regular rhythm. Heart sounds: Normal heart sounds. No murmur heard. No friction rub. No gallop. Pulmonary:      Effort: Pulmonary effort is normal. No respiratory distress. Breath sounds: Normal breath sounds. No wheezing or rales. Abdominal:      General: Bowel sounds are normal. There is no distension. Palpations: Abdomen is soft. There is no mass. Tenderness: There is no abdominal tenderness. There is no guarding or rebound. Musculoskeletal:         General: No tenderness. Normal range of motion. Cervical back: Normal range of motion and neck supple. Skin:     General: Skin is warm and dry. Findings: No rash. Neurological:      Mental Status: She is alert. Cranial Nerves: No cranial nerve deficit. Deep Tendon Reflexes: Reflexes are normal and symmetric. Psychiatric:         Behavior: Behavior normal.          Diagnostic Study Results     Labs -     Recent Results (from the past 12 hour(s))   CBC WITH AUTOMATED DIFF    Collection Time: 01/03/22  9:58 PM   Result Value Ref Range    WBC 10.7 4.6 - 13.2 K/uL    RBC 4.42 4.20 - 5.30 M/uL    HGB 13.7 12.0 - 16.0 g/dL    HCT 41.2 35.0 - 45.0 %    MCV 93.2 78.0 - 100.0 FL    MCH 31.0 24.0 - 34.0 PG    MCHC 33.3 31.0 - 37.0 g/dL    RDW 14.2 11.6 - 14.5 %    PLATELET 231 827 - 404 K/uL    MPV 9.8 9.2 - 11.8 FL    NRBC 0.0 0  WBC    ABSOLUTE NRBC 0.00 0.00 - 0.01 K/uL    NEUTROPHILS 68 40 - 73 %    LYMPHOCYTES 23 21 - 52 %    MONOCYTES 7 3 - 10 %    EOSINOPHILS 1 0 - 5 %    BASOPHILS 0 0 - 2 %    IMMATURE GRANULOCYTES 0 0.0 - 0.5 %    ABS. NEUTROPHILS 7.2 1.8 - 8.0 K/UL    ABS. LYMPHOCYTES 2.5 0.9 - 3.6 K/UL    ABS. MONOCYTES 0.8 0.05 - 1.2 K/UL    ABS. EOSINOPHILS 0.1 0.0 - 0.4 K/UL    ABS. BASOPHILS 0.0 0.0 - 0.1 K/UL    ABS. IMM.  GRANS. 0.0 0.00 - 0.04 K/UL    DF AUTOMATED     METABOLIC PANEL, COMPREHENSIVE    Collection Time: 01/03/22  9:58 PM   Result Value Ref Range    Sodium 141 136 - 145 mmol/L    Potassium 3.7 3.5 - 5.5 mmol/L    Chloride 112 (H) 100 - 111 mmol/L    CO2 20 (L) 21 - 32 mmol/L    Anion gap 9 3.0 - 18 mmol/L    Glucose 103 (H) 74 - 99 mg/dL    BUN 14 7.0 - 18 MG/DL    Creatinine 1.00 0.6 - 1.3 MG/DL    BUN/Creatinine ratio 14 12 - 20      GFR est AA >60 >60 ml/min/1.73m2    GFR est non-AA 54 (L) >60 ml/min/1.73m2    Calcium 9.0 8.5 - 10.1 MG/DL    Bilirubin, total 0.5 0.2 - 1.0 MG/DL    ALT (SGPT) 42 13 - 56 U/L    AST (SGOT) 77 (H) 10 - 38 U/L    Alk. phosphatase 56 45 - 117 U/L    Protein, total 6.8 6.4 - 8.2 g/dL    Albumin 3.7 3.4 - 5.0 g/dL    Globulin 3.1 2.0 - 4.0 g/dL    A-G Ratio 1.2 0.8 - 1.7     LIPASE    Collection Time: 01/03/22  9:58 PM   Result Value Ref Range    Lipase 143 73 - 393 U/L       Radiologic Studies -   No orders to display     CT Results  (Last 48 hours)    None        CXR Results  (Last 48 hours)    None            Medical Decision Making   I am the first provider for this patient. I reviewed the vital signs, available nursing notes, past medical history, past surgical history, family history and social history. Vital Signs-Reviewed the patient's vital signs. Records Reviewed: Nursing Notes    Procedures:  Procedures    Provider Notes (Medical Decision Making): [place] viral syndrome or C. difficile. On Cipro Flagyl orally. Unfortunately patient is unable to provide a stool sample here. Gave her a collection hat and container for their  it was labeled. Swab for Covid sent. Labs and vital signs are stable and plan for discharge and follow-up with her PCP in the morning.     MED RECONCILIATION:  Current Facility-Administered Medications   Medication Dose Route Frequency    QUEtiapine (SEROquel) tablet 150 mg  150 mg Oral NOW    gabapentin (NEURONTIN) capsule 100 mg  100 mg Oral NOW     Current Outpatient Medications   Medication Sig    QUEtiapine (SEROQUEL) 50 mg tablet TAKE 1 TABLET BY MOUTH AT BEDTIME    gabapentin (NEURONTIN) 100 mg capsule Take two (2) caps by mouth three times per day.  sertraline (ZOLOFT) 50 mg tablet TAKE 2 TABLETS BY MOUTH EVERY DAY    memantine (NAMENDA) 10 mg tablet 1 tab twice per day       Disposition:  home    DISCHARGE NOTE:   11:25 PM    Pt has been reexamined. Patient has no new complaints, changes, or physical findings. Care plan outlined and precautions discussed. Results of labs were reviewed with the patient. All medications were reviewed with the patient. All of pt's questions and concerns were addressed. Patient was instructed and agrees to follow up with PCP, as well as to return to the ED upon further deterioration. Patient is ready to go home. Follow-up Information     Follow up With Specialties Details Why Contact Karina Eaton MD [facility] Medicine Schedule an appointment as soon as possible for a visit in 1 day  [address]      [facility] EMERGENCY DEPT Emergency Medicine  If symptoms worsen return immediately [address]  [phone]          Current Discharge Medication List          Diagnosis     Clinical Impression:   1.  Diarrhea, unspecified type

## 2022-01-04 NOTE — ED TRIAGE NOTES
Patient has been diagnosed with diverticulitis and patient is on diarrhea and  has been having several episodes of diarrhea.

## 2022-01-04 NOTE — ED NOTES
Patient's  states she would like to leave now. IV accessed removed. Patient discharge by the Provider.

## 2022-02-17 ENCOUNTER — TRANSCRIBE ORDER (OUTPATIENT)
Dept: SCHEDULING | Age: 77
End: 2022-02-17

## 2022-02-17 DIAGNOSIS — R10.9 UNSPECIFIED ABDOMINAL PAIN: Primary | ICD-10-CM

## 2022-10-19 NOTE — MR AVS SNAPSHOT
[facility] 
 
 
 [address], [address] [place] [address] 
[phone] Patient: Osmani Juan MRN: [id number] MKA:[id number] Visit Information Date & Time Provider Department Dept. Phone Encounter #  
 6/22/2018 12:45 PM Shahnaz Alfredo MD [facility] [phone] [id number] Follow-up Instructions Return in about 2 weeks (around 7/6/2018). Follow-up and Disposition History Your Appointments 7/5/2018 12:45 PM  
Follow Up with Shahnaz Alfredo MD  
[facility]) Appt Note: follow up  
 [address], [address] [place] [address]  
  
   
 [address], [address] [place] [address] Upcoming Health Maintenance Date Due Hepatitis C Screening 1945 COLONOSCOPY 4/18/1963 ZOSTER VACCINE AGE 60> 2/18/2005 Pneumococcal 65+ Low/Medium Risk (2 of 2 - PPSV23) 10/25/2017 BREAST CANCER SCRN MAMMOGRAM 1/25/2018 Influenza Age 5 to Adult 8/1/2018 GLAUCOMA SCREENING Q2Y 2/28/2019 MEDICARE YEARLY EXAM 4/19/2019 DTaP/Tdap/Td series (3 - Td) 10/26/2025 Allergies as of 6/22/2018  Review Complete On: 6/22/2018 By: Sampson Hyatt LPN Severity Noted Reaction Type Reactions Seafood High 02/12/2018    Anaphylaxis Antihistimine   Side Effect Swelling Iodine  02/12/2018    Other (comments) Wheezing and choking Current Immunizations  Reviewed on 5/14/2018 Name Date Influenza High Dose Vaccine PF 9/18/2017, 10/25/2016 Influenza Vaccine 9/17/2012 12:00 AM  
 Influenza Vaccine (Quad) PF 10/26/2015 Pneumococcal Conjugate (PCV-13) 10/25/2016 Tdap 10/26/2015, 10/1/2013 Not reviewed this visit You Were Diagnosed With   
  
 Codes Comments Anxiety    -  Primary ICD-10-CM: F41.9 ICD-9-CM: 300.00 Myalgia     ICD-10-CM: M79.1 ICD-9-CM: 729.1 Vitals BP Pulse Temp Resp Height(growth percentile) 118/72 (BP 1 Location: Left arm, BP Patient Position: Sitting) 96 98.9 °F (37.2 °C) (Tympanic) 22 5' 5\" (1.651 m) Weight(growth percentile) SpO2 BMI OB Status Smoking Status 119 lb (54 kg) 98% 19.8 kg/m2 Hysterectomy Former Smoker Vitals History BMI and BSA Data Body Mass Index Body Surface Area  
 19.8 kg/m 2 1.57 m 2 Preferred Pharmacy Pharmacy Name Phone [address], [address] [facility] [address]  [facility] [phone] Your Updated Medication List  
  
   
This list is accurate as of 18 11:59 PM.  Always use your most recent med list.  
  
  
  
  
 clonazePAM 1 mg tablet Commonly known as:  KlonoPIN  
1 tab twice per day (Stop Ativan)  
  
 estradiol 0.5 mg tablet Commonly known as:  ESTRACE  
TAKE 1 TABLET BY MOUTH DAILY  
  
 galantamine 4 mg tablet Commonly known as:  RAZADYNE  
1 tab twice per day HYDROcodone-acetaminophen 5-325 mg per tablet Commonly known as:  Jalil Mohit  
1 tab three times per day LORazepam 0.5 mg tablet Commonly known as:  ATIVAN  
1 tab three times per day as needed for anxity  
  
 naloxone 4 mg/actuation nasal spray Commonly known as:  [employer] Use 1 spray intranasally into 1 nostril. Use a new Narcan nasal spray for subsequent doses and administer into alternating nostrils. May repeat every 2 to 3 minutes as needed. predniSONE 10 mg tablet Commonly known as:  DELTASONE  
1 tab daily QUEtiapine 50 mg tablet Commonly known as:  SEROquel TAKE 1 TABLET BY MOUTH AT BEDTIME  
  
 sertraline 50 mg tablet Commonly known as:  ZOLOFT  
TAKE 1 TABLET BY MOUTH DAILY  
  
 vitamin e 1,000 unit capsule Commonly known as:  E GEMS Take 1,000 Units by mouth daily. Prescriptions Printed Refills  
 clonazePAM (KLONOPIN) 1 mg tablet 3 Si tab twice per day (Stop Ativan) Class: Print HYDROcodone-acetaminophen (NORCO) 5-325 mg per tablet 0 Si tab three times per day Class: Print  
 naloxone (NARCAN) 4 mg/actuation nasal spray 2 Sig: Use 1 spray intranasally into 1 nostril. Use a new Narcan nasal spray for subsequent doses and administer into alternating nostrils. May repeat every 2 to 3 minutes as needed. Class: Print Prescriptions Sent to Pharmacy Refills QUEtiapine (SEROQUEL) 50 mg tablet (Discontinued) 3 Si tab at bedtime (New Strength) Class: Normal  
 Pharmacy: [address] [place] [address] Ph #: [phone] Reason for Discontinue: Reorder  
 sertraline (ZOLOFT) 50 mg tablet (Discontinued) 2 Si tab daily (note:  New strength) Class: Normal  
 Pharmacy: [address] [place] [address] Ph #: [phone] Reason for Discontinue: Reorder Follow-up Instructions Return in about 2 weeks (around 2018). Introducing [facility]! Dear Francesco Torres: 
Thank you for requesting a [facility] account. Our records indicate that you already have an active [facility] [facility] account. You can access your account anytime at [email or web address] Did you know that you can access your hospital and ER discharge instructions at any time in [facility] [facility]? You can also review all of your test results from your hospital stay or ER visit. Additional Information If you have questions, please visit the Frequently Asked Questions section of the [employer] website at [email or web address] Remember, [facility] is NOT to be used for urgent needs. For medical emergencies, dial 911. Now available from your iPhone and Android! Please provide this summary of care documentation to your next provider. Your primary care clinician is listed as Caitlin Barbour.  If you have any questions after today's visit, please call [phone]. right thyroid lobectomy with resections ubsternal goiter right thyroid lobectomy with resections substernal goiter

## (undated) DEVICE — MEDI-VAC NON-CONDUCTIVE SUCTION TUBING: Brand: CARDINAL HEALTH

## (undated) DEVICE — BITE BLOCK ENDOSCP UNIV AD 6 TO 9.4 MM

## (undated) DEVICE — CATHETER SUCT TR FL TIP 14FR W/ O CTRL

## (undated) DEVICE — STERILE POLYISOPRENE POWDER-FREE SURGICAL GLOVES: Brand: PROTEXIS

## (undated) DEVICE — SYRINGE MED 25GA 3ML L5/8IN SUBQ PLAS W/ DETACH NDL SFTY

## (undated) DEVICE — BASIN EMESIS 500CC ROSE 250/CS 60/PLT: Brand: MEDEGEN MEDICAL PRODUCTS, LLC

## (undated) DEVICE — FLEX ADVANTAGE 3000CC: Brand: FLEX ADVANTAGE

## (undated) DEVICE — SOLUTION IRRIG 1000ML H2O STRL BLT

## (undated) DEVICE — SYR 50ML SLIP TIP NSAF LF STRL --

## (undated) DEVICE — MEDI-VAC SUCTION HIGH CAPACITY: Brand: CARDINAL HEALTH

## (undated) DEVICE — FCPS RAD JAW 4LC 240CM W/NDL -- BX/20 RADIAL JAW 4

## (undated) DEVICE — AIRLIFE™ NASAL OXYGEN CANNULA CURVED, FLARED TIP WITH 14 FOOT (4.3 M) CRUSH-RESISTANT TUBING, OVER-THE-EAR STYLE: Brand: AIRLIFE™

## (undated) DEVICE — GAUZE SPONGES,16 PLY: Brand: CURITY

## (undated) DEVICE — ENDOSCOPY PUMP TUBING/ CAP SET: Brand: ERBE

## (undated) DEVICE — FLUFF AND POLYMER UNDERPAD,EXTRA HEAVY: Brand: WINGS

## (undated) DEVICE — (D)GLOVE EXAM LG NITRL NS -- DISC BY MFR NO SUB